# Patient Record
Sex: MALE | Race: WHITE | Employment: OTHER | URBAN - METROPOLITAN AREA
[De-identification: names, ages, dates, MRNs, and addresses within clinical notes are randomized per-mention and may not be internally consistent; named-entity substitution may affect disease eponyms.]

---

## 2020-01-09 ENCOUNTER — OFFICE VISIT (OUTPATIENT)
Dept: PRIMARY CARE CLINIC | Age: 60
End: 2020-01-09
Payer: COMMERCIAL

## 2020-01-09 VITALS
RESPIRATION RATE: 17 BRPM | HEIGHT: 72 IN | BODY MASS INDEX: 32.02 KG/M2 | OXYGEN SATURATION: 98 % | HEART RATE: 78 BPM | TEMPERATURE: 97.2 F | DIASTOLIC BLOOD PRESSURE: 82 MMHG | SYSTOLIC BLOOD PRESSURE: 142 MMHG | WEIGHT: 236.4 LBS

## 2020-01-09 PROCEDURE — 99213 OFFICE O/P EST LOW 20 MIN: CPT | Performed by: NURSE PRACTITIONER

## 2020-01-09 RX ORDER — PREDNISONE 20 MG/1
40 TABLET ORAL DAILY
Qty: 10 TABLET | Refills: 0 | Status: SHIPPED | OUTPATIENT
Start: 2020-01-09 | End: 2020-01-09 | Stop reason: CLARIF

## 2020-01-09 RX ORDER — PREDNISONE 20 MG/1
40 TABLET ORAL DAILY
Qty: 6 TABLET | Refills: 0 | Status: SHIPPED | OUTPATIENT
Start: 2020-01-09 | End: 2020-01-12

## 2020-01-09 RX ORDER — GUAIFENESIN 600 MG/1
600 TABLET, EXTENDED RELEASE ORAL 2 TIMES DAILY
Qty: 30 TABLET | Refills: 0 | Status: SHIPPED | OUTPATIENT
Start: 2020-01-09 | End: 2020-01-24

## 2020-01-09 RX ORDER — AMOXICILLIN AND CLAVULANATE POTASSIUM 875; 125 MG/1; MG/1
1 TABLET, FILM COATED ORAL 2 TIMES DAILY
Qty: 20 TABLET | Refills: 0 | Status: SHIPPED | OUTPATIENT
Start: 2020-01-09 | End: 2020-01-19

## 2020-01-09 ASSESSMENT — ENCOUNTER SYMPTOMS
RHINORRHEA: 0
SINUS COMPLAINT: 1
VOMITING: 0
EYE DISCHARGE: 0
SHORTNESS OF BREATH: 0
SINUS PRESSURE: 1
WHEEZING: 0
SINUS PAIN: 1
NAUSEA: 0
TROUBLE SWALLOWING: 0
COUGH: 1
SORE THROAT: 1
EYE ITCHING: 0
DIARRHEA: 0
EYE PAIN: 0

## 2020-01-09 NOTE — PATIENT INSTRUCTIONS
SURVEY:    You may be receiving a survey from [employer] regarding your visit today. Please complete the survey to enable us to provide the highest quality of care to you and your family. If you cannot score us a very good on any question, please call the office to discuss how we could have made your experience a very good one. Thank you. Patient Education        Sinusitis: Care Instructions  Your Care Instructions    Sinusitis is an infection of the lining of the sinus cavities in your head. Sinusitis often follows a cold. It causes pain and pressure in your head and face. In most cases, sinusitis gets better on its own in 1 to 2 weeks. But some mild symptoms may last for several weeks. Sometimes antibiotics are needed. Follow-up care is a key part of your treatment and safety. Be sure to make and go to all appointments, and call your doctor if you are having problems. It's also a good idea to know your test results and keep a list of the medicines you take. How can you care for yourself at home? · Take an over-the-counter pain medicine, such as acetaminophen (Tylenol), ibuprofen (Advil, Motrin), or naproxen (Aleve). Read and follow all instructions on the label. · If the doctor prescribed antibiotics, take them as directed. Do not stop taking them just because you feel better. You need to take the full course of antibiotics. · Be careful when taking over-the-counter cold or flu medicines and Tylenol at the same time. Many of these medicines have acetaminophen, which is Tylenol. Read the labels to make sure that you are not taking more than the recommended dose. Too much acetaminophen (Tylenol) can be harmful. · Breathe warm, moist air from a steamy shower, a hot bath, or a sink filled with hot water. Avoid cold, dry air. Using a humidifier in your home may help. Follow the directions for cleaning the machine.   · Use saline (saltwater) nasal washes to help keep your nasal passages open and wash out mucus and bacteria. You can buy saline nose drops at a grocery store or drugstore. Or you can make your own at home by adding 1 teaspoon of salt and 1 teaspoon of baking soda to 2 cups of distilled water. If you make your own, fill a bulb syringe with the solution, insert the tip into your nostril, and squeeze gently. Ryan Joshua your nose. · Put a hot, wet towel or a warm gel pack on your face 3 or 4 times a day for 5 to 10 minutes each time. · Try a decongestant nasal spray like oxymetazoline (Afrin). Do not use it for more than 3 days in a row. Using it for more than 3 days can make your congestion worse. When should you call for help? Call your doctor now or seek immediate medical care if:    · You have new or worse swelling or redness in your face or around your eyes.     · You have a new or higher fever.    Watch closely for changes in your health, and be sure to contact your doctor if:    · You have new or worse facial pain.     · The mucus from your nose becomes thicker (like pus) or has new blood in it.     · You are not getting better as expected. Where can you learn more? Go to https://[employer].wedgies. org and sign in to your [employer] account. Enter M071 in the [facility] box to learn more about \"Sinusitis: Care Instructions. \"     If you do not have an account, please click on the \"Sign Up Now\" link. Current as of: July 28, 2019  Content Version: 12.3  © 2851-0943 Healthwise, Incorporated. Care instructions adapted under license by [facility] ([facility]). If you have questions about a medical condition or this instruction, always ask your healthcare professional. [place] [address] any warranty or liability for your use of this information.

## 2020-01-09 NOTE — PROGRESS NOTES
[facility] & [facility] PHYSICIANS  [facility] PRIMARY CARE [place] [address] [address] [facility]  Dept: [phone]  Dept Fax: [phone]    Mahi Allen is a 62 y.o. male who presentsto the [facility] today for his medical conditions/complaints as noted below. Mahi Allen is c/o of Other (sinus pressure, nasal drainage, ear pain x 1 month) and Cough      HPI:     Josh Colmenares is here today for a walk in visit. Sinus Problem   This is a new problem. The current episode started more than 1 month ago. The problem has been gradually worsening since onset. There has been no fever. Associated symptoms include congestion, coughing, ear pain, headaches, sinus pressure, sneezing and a sore throat. Pertinent negatives include no chills or shortness of breath. Past treatments include nothing. No past medical history on file. Current Outpatient Medications   Medication Sig Dispense Refill    amoxicillin-clavulanate (AUGMENTIN) 875-125 MG per tablet Take 1 tablet by mouth 2 times daily for 10 days 20 tablet 0    predniSONE (DELTASONE) 20 MG tablet Take 2 tablets by mouth daily for 3 days 6 tablet 0    guaiFENesin (MUCINEX) 600 MG extended release tablet Take 1 tablet by mouth 2 times daily for 15 days 30 tablet 0     No current facility-administered medications for this visit. No Known Allergies    Subjective:      Review of Systems   Constitutional: Negative for activity change, chills, fatigue and fever. HENT: Positive for congestion, ear pain, postnasal drip, sinus pressure, sinus pain, sneezing and sore throat. Negative for rhinorrhea and trouble swallowing. Eyes: Negative for pain, discharge and itching. Respiratory: Positive for cough. Negative for shortness of breath and wheezing. Cardiovascular: Negative for chest pain and palpitations. Gastrointestinal: Negative for diarrhea, nausea and vomiting. Neurological: Positive for headaches.        Objective:     Physical Will treat with Augmentin. Plan:     Exam findings and plan of care discussed at bedside including:    · Start Augmentin-   today; discussed administration and side effects. I have encouraged to take with a probiotic and food to descrease side effects. Examples of probiotics discussed. · Supportive management discussed including: Encouraged to increase fluids and rest, Mucinex/Guaifenesin OTC as directed on package, Nasal saline spray OTC every couple of hours for nasal congestion, Warm facial packs applied to face for 5 to 10 minutes, 3 times per day. Aleve/Ibuprofen/Tylenol OTC PRN for pain, discomfort or fever  · Written instructions provided with AVS.      · To ER or call 911 if any difficulty breathing, shortness of breath, inability to swallow, hives, rash, facial/tongue swelling or temp greater than 103 degrees. · Follow up as needed with PCP if symptoms worsen or do not improve      Return if symptoms worsen or fail to improve. Orders Placed This Encounter   Medications    DISCONTD: predniSONE (DELTASONE) 20 MG tablet     Sig: Take 2 tablets by mouth daily for 5 days     Dispense:  10 tablet     Refill:  0    amoxicillin-clavulanate (AUGMENTIN) 875-125 MG per tablet     Sig: Take 1 tablet by mouth 2 times daily for 10 days     Dispense:  20 tablet     Refill:  0    predniSONE (DELTASONE) 20 MG tablet     Sig: Take 2 tablets by mouth daily for 3 days     Dispense:  6 tablet     Refill:  0    guaiFENesin (MUCINEX) 600 MG extended release tablet     Sig: Take 1 tablet by mouth 2 times daily for 15 days     Dispense:  30 tablet     Refill:  0          All patient questions answered. Pt voiced understanding.       Electronically signed by ELLIOTT Hutchinson CNP on 1/9/2020 at 10:13 AM

## 2020-02-04 ENCOUNTER — OFFICE VISIT (OUTPATIENT)
Dept: PRIMARY CARE CLINIC | Age: 60
End: 2020-02-04
Payer: COMMERCIAL

## 2020-02-04 VITALS
SYSTOLIC BLOOD PRESSURE: 136 MMHG | HEART RATE: 82 BPM | BODY MASS INDEX: 34.54 KG/M2 | WEIGHT: 255 LBS | HEIGHT: 72 IN | DIASTOLIC BLOOD PRESSURE: 76 MMHG | TEMPERATURE: 97.4 F

## 2020-02-04 PROCEDURE — 99213 OFFICE O/P EST LOW 20 MIN: CPT | Performed by: NURSE PRACTITIONER

## 2020-02-04 RX ORDER — FLUTICASONE PROPIONATE 50 MCG
1 SPRAY, SUSPENSION (ML) NASAL DAILY
Qty: 1 BOTTLE | Refills: 0 | Status: SHIPPED | OUTPATIENT
Start: 2020-02-04

## 2020-02-04 ASSESSMENT — ENCOUNTER SYMPTOMS
SINUS COMPLAINT: 1
SINUS PRESSURE: 0
WHEEZING: 0
COUGH: 0
NAUSEA: 0
DIARRHEA: 0
SHORTNESS OF BREATH: 0
EYE REDNESS: 0
PHOTOPHOBIA: 0
SINUS PAIN: 0
VOMITING: 0
RHINORRHEA: 0

## 2020-02-04 NOTE — PATIENT INSTRUCTIONS
SURVEY:    You may be receiving a survey from [employer] regarding your visit today. Please complete the survey to enable us to provide the highest quality of care to you and your family. If you cannot score us a very good on any question, please call the office to discuss how we could of made your experience a very good one. Thank you. Patient Education        Saline Nasal Washes: Care Instructions  Your Care Instructions  Saline nasal washes help keep the nasal passages open by washing out thick or dried mucus. This simple remedy can help relieve symptoms of allergies, sinusitis, and colds. It also can make the nose feel more comfortable by keeping the mucous membranes moist. You may notice a little burning sensation in your nose the first few times you use the solution, but this usually gets better in a few days. Follow-up care is a key part of your treatment and safety. Be sure to make and go to all appointments, and call your doctor if you are having problems. It's also a good idea to know your test results and keep a list of the medicines you take. How can you care for yourself at home? · You can buy premixed saline solution in a squeeze bottle or other sinus rinse products at a drugstore. Read and follow the instructions on the label. · You also can make your own saline solution by adding 1 teaspoon of salt and 1 teaspoon of baking soda to 2 cups of distilled water. · If you use a homemade solution, pour a small amount into a clean bowl. Using a rubber bulb syringe, squeeze the syringe and place the tip in the salt water. Pull a small amount of the salt water into the syringe by relaxing your hand. · Sit down with your head tilted slightly back. Do not lie down. Put the tip of the bulb syringe or the squeeze bottle a little way into one of your nostrils. Gently drip or squirt a few drops into the nostril. Repeat with the other nostril.  Some sneezing and gagging are normal at first.  · Gently blow your nose. · Wipe the syringe or bottle tip clean after each use. · Repeat this 2 or 3 times a day. · Use nasal washes gently if you have nosebleeds often. When should you call for help? Watch closely for changes in your health, and be sure to contact your doctor if:    · You often get nosebleeds.     · You have problems doing the nasal washes. Where can you learn more? Go to https://[employer] inc. org and sign in to your [employer] account. Enter [phone] [id number] [phone] in the [employer] box to learn more about \"Saline Nasal Washes: Care Instructions. \"     If you do not have an account, please click on the \"Sign Up Now\" link. Current as of: July 28, 2019  Content Version: 12.3  © 3975-8027 Healthwise, Incorporated. Care instructions adapted under license by [facility] ([facility]). If you have questions about a medical condition or this instruction, always ask your healthcare professional. [address]  any warranty or liability for your use of this information.

## 2020-02-05 ENCOUNTER — HOSPITAL ENCOUNTER (OUTPATIENT)
Age: 60
Discharge: HOME | End: 2020-02-05
Payer: COMMERCIAL

## 2020-02-05 LAB
ABSOLUTE EOS #: 0.07 K/UL (ref 0–0.44)
ABSOLUTE IMMATURE GRANULOCYTE: <0.03 K/UL (ref 0–0.3)
ABSOLUTE LYMPH #: 1.58 K/UL (ref 1.1–3.7)
ABSOLUTE MONO #: 0.4 K/UL (ref 0.1–1.2)
ANION GAP SERPL CALCULATED.3IONS-SCNC: 14 MMOL/L (ref 9–17)
BASOPHILS # BLD: 1 % (ref 0–2)
BASOPHILS ABSOLUTE: 0.04 K/UL (ref 0–0.2)
BUN BLDV-MCNC: 15 MG/DL (ref 8–23)
BUN/CREAT BLD: 13 (ref 9–20)
CALCIUM SERPL-MCNC: 9 MG/DL (ref 8.6–10.4)
CHLORIDE BLD-SCNC: 102 MMOL/L (ref 98–107)
CO2: 23 MMOL/L (ref 20–31)
CREAT SERPL-MCNC: 1.12 MG/DL (ref 0.7–1.2)
DIFFERENTIAL TYPE: NORMAL
EOSINOPHILS RELATIVE PERCENT: 2 % (ref 1–4)
GFR AFRICAN AMERICAN: >60 ML/MIN
GFR NON-AFRICAN AMERICAN: >60 ML/MIN
GFR SERPL CREATININE-BSD FRML MDRD: NORMAL ML/MIN/{1.73_M2}
GFR SERPL CREATININE-BSD FRML MDRD: NORMAL ML/MIN/{1.73_M2}
GLUCOSE BLD-MCNC: 97 MG/DL (ref 70–99)
HCT VFR BLD CALC: 43.9 % (ref 40.7–50.3)
HEMOGLOBIN: 14.1 G/DL (ref 13–17)
IMMATURE GRANULOCYTES: 0 %
LYMPHOCYTES # BLD: 35 % (ref 24–43)
MCH RBC QN AUTO: 30.1 PG (ref 25.2–33.5)
MCHC RBC AUTO-ENTMCNC: 32.1 G/DL (ref 28.4–34.8)
MCV RBC AUTO: 93.6 FL (ref 82.6–102.9)
MONOCYTES # BLD: 9 % (ref 3–12)
NRBC AUTOMATED: 0 PER 100 WBC
PDW BLD-RTO: 12.3 % (ref 11.8–14.4)
PLATELET # BLD: 258 K/UL (ref 138–453)
PLATELET ESTIMATE: NORMAL
PMV BLD AUTO: 10.5 FL (ref 8.1–13.5)
POTASSIUM SERPL-SCNC: 4.1 MMOL/L (ref 3.7–5.3)
RBC # BLD: 4.69 M/UL (ref 4.21–5.77)
RBC # BLD: NORMAL 10*6/UL
SEG NEUTROPHILS: 53 % (ref 36–65)
SEGMENTED NEUTROPHILS ABSOLUTE COUNT: 2.4 K/UL (ref 1.5–8.1)
SODIUM BLD-SCNC: 139 MMOL/L (ref 135–144)
TSH SERPL DL<=0.05 MIU/L-ACNC: 3.94 MIU/L (ref 0.3–5)
WBC # BLD: 4.5 K/UL (ref 3.5–11.3)
WBC # BLD: NORMAL 10*3/UL

## 2020-02-05 PROCEDURE — 80048 BASIC METABOLIC PNL TOTAL CA: CPT

## 2020-02-05 PROCEDURE — 36415 COLL VENOUS BLD VENIPUNCTURE: CPT

## 2020-02-05 PROCEDURE — 85025 COMPLETE CBC W/AUTO DIFF WBC: CPT

## 2020-02-05 PROCEDURE — 84443 ASSAY THYROID STIM HORMONE: CPT

## 2020-02-05 ASSESSMENT — ENCOUNTER SYMPTOMS
SORE THROAT: 0
EYE DISCHARGE: 1

## 2020-02-06 ENCOUNTER — OFFICE VISIT (OUTPATIENT)
Dept: OTOLARYNGOLOGY | Age: 60
End: 2020-02-06
Payer: COMMERCIAL

## 2020-02-06 VITALS
SYSTOLIC BLOOD PRESSURE: 124 MMHG | HEIGHT: 72 IN | WEIGHT: 229 LBS | DIASTOLIC BLOOD PRESSURE: 85 MMHG | BODY MASS INDEX: 31.02 KG/M2 | HEART RATE: 76 BPM

## 2020-02-06 PROBLEM — H93.13 TINNITUS OF BOTH EARS: Status: ACTIVE | Noted: 2020-02-06

## 2020-02-06 PROCEDURE — 99203 OFFICE O/P NEW LOW 30 MIN: CPT | Performed by: PHYSICIAN ASSISTANT

## 2020-02-06 SDOH — HEALTH STABILITY: MENTAL HEALTH: HOW OFTEN DO YOU HAVE A DRINK CONTAINING ALCOHOL?: NEVER

## 2020-02-06 ASSESSMENT — ENCOUNTER SYMPTOMS
WHEEZING: 0
EYE DISCHARGE: 1
CHOKING: 0
NAUSEA: 0
COUGH: 0
STRIDOR: 0
EYE PAIN: 0
VOMITING: 0
EYE REDNESS: 0
VOICE CHANGE: 0
PHOTOPHOBIA: 0
TROUBLE SWALLOWING: 0
ABDOMINAL DISTENTION: 0
ABDOMINAL PAIN: 0
CONSTIPATION: 0
CHEST TIGHTNESS: 0
DIARRHEA: 0
RHINORRHEA: 0
RECTAL PAIN: 0
SINUS PAIN: 0
SORE THROAT: 0
BACK PAIN: 0
SHORTNESS OF BREATH: 0
SINUS PRESSURE: 0
BLOOD IN STOOL: 0
ANAL BLEEDING: 0
EYE ITCHING: 0
FACIAL SWELLING: 0
COLOR CHANGE: 0
APNEA: 0

## 2020-02-06 NOTE — PROGRESS NOTES
glands    Temporomandibular Joint:  fine crepitus with motion bilaterally, no tenderness on palpation, no trismus, motion symmetric    EYES:  PERRL, extra-ocular movements intact, no nystagmus, sclera white, no redness of eyes, no watering of eyes    EARS:    Bilateral External Ears: no pits, no tags    Right External Ear: normally formed, no lesions; no mastoid tenderness, redness or swelling    Left External Ear: normally formed, no lesions; no mastoid tenderness, redness or swelling    Right External Auditory Canal: normally formed, no redness, no swelling, no lesions, healthy skin, no obstructing cerumen, no discharge    Left External Auditory Canal: normally formed, no redness, no swelling, no lesions, healthy skin, no obstructing cerumen, no discharge  Scant cerumen removed with suction.     Right Tympanic Membrane:  translucent, immobile to pneumatic otoscopy, no perforation, light reflex present, inferior drum with diluted milky quality (this is a subtle finding), no overt effusion     Left Tympanic Membrane:  translucent, immobile to pneumatic otoscopy, no perforation, light reflex present, inferior drum with diluted milky quality (this is a subtle finding), appears mildly retracted, no overt effusion    Hearing: intact to spoken voice, intact to finger rub both ears, Anthony midline, Right Ear: Rinne AC>BC, Left Ear: Rinne AC>BC    NOSE:    Nasal Skin: no lesions, no lacerations, no scars    Nasal Dorsum: symmetric with no visible or palpable deformities    Nasal Tip: normal symmetric nasal tip, normal nasal valves    Nasal Mucosa: normal, pink and dry, no drainage, no polyps    Septum: not markedly deformed, no exposed vessels, no bleeding, no septal granuloma, no perforation    Turbinates: normal size and conformation, right inferior turbinate 3-4+, left inferior turbinate 1+ or less    ORAL CAVITY/MOUTH:    Lips, teeth, gums: normal lips, normal gums, dentition intact, ground down/worn down occlusal surfaces of the lower anterior dentition     Oral Mucosa: normal, moist, no lesions    Palate: normal hard palate, normal soft palate, symmetric palatal elevation    Floor of Mouth: normal floor of mouth    Tongue: normal tongue, no lesions, no edema, no masses, normal mucosa, mobile    Tonsils: absent    Posterior pharynx: normally formed, no PND, no masses or exudate, no lesions, no redness    NECK:    Neck: no masses, trachea midline, functional active range of motion, no cysts or pits, no tenderness to palpation  Good extension and flexion. Decreased rotation to the right when compared to rotation to the left. Thyroid: normal thyroid, no enlargement, no tenderness, no nodules    LYMPH NODES:    Cervical: no palpable lymph node enlargement    RESPIRATORY:    Inspection/Auscultation: good air movement, chest expands symmetrically, normal breath sounds, no wheezing, no stridor, no crackles, no rhonchi    CARDIOVASCULAR SYSTEM:  Heart regular rate and rhythm, normal S1 and S2, no m/r/g  No carotid thrills, no carotid bruits    Observation/Palpation of Peripheral Vascular System:  no edema    SKIN:    General Appearance:  warm and dry,    NEUROLOGICAL SYSTEM:    Orientation: oriented to time, oriented to place, oriented to person, oriented to situation    Cranial Nerves: Cranial Nerves II-XII intact, normal facial movement    PSYCHIATRIC:    Mood and affect:  Affect appropriate for situation/setting. Assessment and Plan:  The causes of tinnitus are discussed in the context of the patient's history and exam findings. I have discussed the management of tinnitus with the avoidance of silence and the use of background noise for suppression such as a clock radio set between stations, a fan, television, or other sound generating device particularly if the tinnitus is causing disturbance of sleep.  We have discussed that high doses of aspirin and related drugs, caffeine and other stimulants, alcohol use, and

## 2020-02-06 NOTE — PROGRESS NOTES
Review of Systems   Constitutional: Negative for activity change, appetite change, chills, diaphoresis, fever and unexpected weight change. HENT: Positive for postnasal drip, sneezing and tinnitus. Negative for congestion, dental problem, drooling, ear discharge, ear pain, facial swelling, hearing loss, mouth sores, nosebleeds, rhinorrhea, sinus pressure, sinus pain, sore throat, trouble swallowing and voice change. Eyes: Positive for discharge. Negative for photophobia, pain, redness, itching and visual disturbance. Respiratory: Negative for apnea, cough, choking, chest tightness, shortness of breath, wheezing and stridor. Cardiovascular: Negative for chest pain, palpitations and leg swelling. Gastrointestinal: Negative for abdominal distention, abdominal pain, anal bleeding, blood in stool, constipation, diarrhea, nausea, rectal pain and vomiting. Endocrine: Negative for cold intolerance, heat intolerance, polydipsia, polyphagia and polyuria. Genitourinary: Negative for decreased urine volume, difficulty urinating, discharge, dysuria, enuresis, flank pain, frequency, genital sores, hematuria, penile pain, penile swelling, scrotal swelling, testicular pain and urgency. Musculoskeletal: Negative for arthralgias, back pain, gait problem, joint swelling, myalgias, neck pain and neck stiffness. Skin: Negative for color change, pallor, rash and wound. Allergic/Immunologic: Negative for environmental allergies, food allergies and immunocompromised state. Neurological: Negative for dizziness, tremors, seizures, syncope, facial asymmetry, speech difficulty, weakness, light-headedness, numbness and headaches. Hematological: Negative. Negative for adenopathy. Does not bruise/bleed easily. Psychiatric/Behavioral: Negative for agitation, behavioral problems, confusion, decreased concentration, dysphoric mood, hallucinations, self-injury, sleep disturbance and suicidal ideas.  The patient is not

## 2020-02-10 ENCOUNTER — OFFICE VISIT (OUTPATIENT)
Dept: UROLOGY | Age: 60
End: 2020-02-10
Payer: COMMERCIAL

## 2020-02-10 VITALS
DIASTOLIC BLOOD PRESSURE: 70 MMHG | HEIGHT: 72 IN | WEIGHT: 233 LBS | SYSTOLIC BLOOD PRESSURE: 110 MMHG | BODY MASS INDEX: 31.56 KG/M2

## 2020-02-10 PROCEDURE — 51798 US URINE CAPACITY MEASURE: CPT | Performed by: UROLOGY

## 2020-02-10 PROCEDURE — 99204 OFFICE O/P NEW MOD 45 MIN: CPT | Performed by: UROLOGY

## 2020-02-10 ASSESSMENT — ENCOUNTER SYMPTOMS
EYE PAIN: 0
SHORTNESS OF BREATH: 0
VOMITING: 0
COUGH: 0
EYE REDNESS: 0
BACK PAIN: 0
WHEEZING: 0
COLOR CHANGE: 0
NAUSEA: 0
ABDOMINAL PAIN: 0

## 2020-02-10 NOTE — PROGRESS NOTES
HPI:    Patient is a 62 y.o. male in no acute distress. He is alert and oriented to person, place, and time. Patient is here today for new patient. Patient was referred by ELLIOTT Adamson for lower urinary tract symptoms. Patient does have a significant urologic history. He does have a history of prostate biopsy which was negative. He did have an MRI of the prostate done approximately 3 years ago. This did show a PI-RADS 2 lesion. Patient's PSA at that point time was 6.02. Patient has been getting his PSAs done in the [facility] fairs. He does claim that they have ranged from 4-7. Patient does have some baseline voiding issues. He is not interested in any medication. Patient reports no flank pain nausea vomiting today. He has had no unintentional weight loss or decreased appetite. IPSS Questionnaire (AUA-7): Over the past month    1)  How often have you had a sensation of not emptying your bladder completely after you finish urinating? 0 - Not at all   2)  How often have you had to urinate again less than two hours after you finished urinating? 0 - Not at all   3)  How often have you found you stopped and started again several times when you urinated? 1 - Less than 1 time in 5   4) How difficult have you found it to postpone urination? 0 - Not at all   5) How often have you had a weak urinary stream?  3 - About half the time   6) How often have you had to push or strain to begin urination? 0 - Not at all   7) How many times did you most typically get up to urinate from the time you went to bed until the time you got up in the morning?  0 - None   Total 4   QOL 0       No past medical history on file. No past surgical history on file. Outpatient Encounter Medications as of 2/10/2020   Medication Sig Dispense Refill    fluticasone (FLONASE) 50 MCG/ACT nasal spray 1 spray by Each Nostril route daily 1 Bottle 0     No facility-administered encounter medications on file as of 2/10/2020. Current Outpatient Medications on File Prior to Visit   Medication Sig Dispense Refill    fluticasone (FLONASE) 50 MCG/ACT nasal spray 1 spray by Each Nostril route daily 1 Bottle 0     No current facility-administered medications on file prior to visit. Patient has no known allergies. No family history on file. Social History     Tobacco Use   Smoking Status Never Smoker   Smokeless Tobacco Never Used       Social History     Substance and Sexual Activity   Alcohol Use Never    Frequency: Never       Review of Systems   Constitutional: Negative for appetite change, chills and fever. Eyes: Negative for pain, redness and visual disturbance. Respiratory: Negative for cough, shortness of breath and wheezing. Cardiovascular: Negative for chest pain and leg swelling. Gastrointestinal: Negative for abdominal pain, nausea and vomiting. Genitourinary: Negative for difficulty urinating, discharge, dysuria, flank pain, frequency, hematuria, scrotal swelling and testicular pain. Musculoskeletal: Negative for back pain, joint swelling and myalgias. Skin: Negative for color change, rash and wound. Neurological: Negative for dizziness, tremors and numbness. Hematological: Negative for adenopathy. Does not bruise/bleed easily. There were no vitals taken for this visit. PHYSICAL EXAM:  Constitutional: Patient in no acute distress; Neuro: alert and oriented to person place and time. Psych: Mood and affect normal.  Skin: Normal  Lungs: Respiratory effort normal  Cardiovascular:  Normal peripheral pulses  Abdomen: Soft, non-tender, non-distended with no CVA, flank pain, hepatosplenomegaly or hernia. Kidneys normal.  Bladder non-tender and not distended.   Lymphatics: no palpable lymphadenopathy  Penis normal  Urethral meatus normal  Scrotal exam normal  Testicles normal bilaterally  Epididymis normal bilaterally  No evidence of inguinal hernia        Lab Results   Component Value Date

## 2020-02-10 NOTE — PATIENT INSTRUCTIONS
SURVEY:    You may be receiving a survey from [employer] regarding your visit today. Please complete the survey to enable us to provide the highest quality of care to you and your family. If you cannot score us a very good on any question, please call the office to discuss how we could have made your experience a very good one. Thank you.

## 2020-02-11 ENCOUNTER — TELEPHONE (OUTPATIENT)
Dept: UROLOGY | Age: 60
End: 2020-02-11

## 2020-02-11 NOTE — TELEPHONE ENCOUNTER
Patient called back and was informed of response. Patient scheduled for an appointment on 3/18/20 with PSA a few days prior.

## 2020-03-09 ENCOUNTER — TELEPHONE (OUTPATIENT)
Dept: GASTROENTEROLOGY | Age: 60
End: 2020-03-09

## 2020-03-09 DIAGNOSIS — Z86.010 HISTORY OF COLON POLYPS: Primary | ICD-10-CM

## 2020-03-10 PROBLEM — Z86.0100 HISTORY OF COLON POLYPS: Status: ACTIVE | Noted: 2020-03-10

## 2020-03-10 PROBLEM — Z86.010 HISTORY OF COLON POLYPS: Status: ACTIVE | Noted: 2020-03-10

## 2020-03-10 RX ORDER — SODIUM, POTASSIUM,MAG SULFATES 17.5-3.13G
SOLUTION, RECONSTITUTED, ORAL ORAL
Qty: 2 BOTTLE | Refills: 0 | Status: SHIPPED | OUTPATIENT
Start: 2020-03-10

## 2020-03-11 ENCOUNTER — OFFICE VISIT (OUTPATIENT)
Dept: PRIMARY CARE CLINIC | Age: 60
End: 2020-03-11
Payer: COMMERCIAL

## 2020-03-11 ENCOUNTER — HOSPITAL ENCOUNTER (OUTPATIENT)
Age: 60
Discharge: HOME | End: 2020-03-11
Payer: COMMERCIAL

## 2020-03-11 VITALS
HEIGHT: 72 IN | RESPIRATION RATE: 18 BRPM | SYSTOLIC BLOOD PRESSURE: 121 MMHG | WEIGHT: 234 LBS | BODY MASS INDEX: 31.69 KG/M2 | TEMPERATURE: 98 F | DIASTOLIC BLOOD PRESSURE: 72 MMHG | HEART RATE: 74 BPM

## 2020-03-11 LAB — PROSTATE SPECIFIC ANTIGEN: 6.56 UG/L

## 2020-03-11 PROCEDURE — 84153 ASSAY OF PSA TOTAL: CPT

## 2020-03-11 PROCEDURE — 99214 OFFICE O/P EST MOD 30 MIN: CPT | Performed by: NURSE PRACTITIONER

## 2020-03-11 PROCEDURE — 36415 COLL VENOUS BLD VENIPUNCTURE: CPT

## 2020-03-11 SDOH — ECONOMIC STABILITY: TRANSPORTATION INSECURITY
IN THE PAST 12 MONTHS, HAS LACK OF TRANSPORTATION KEPT YOU FROM MEETINGS, WORK, OR FROM GETTING THINGS NEEDED FOR DAILY LIVING?: NO

## 2020-03-11 SDOH — ECONOMIC STABILITY: TRANSPORTATION INSECURITY
IN THE PAST 12 MONTHS, HAS THE LACK OF TRANSPORTATION KEPT YOU FROM MEDICAL APPOINTMENTS OR FROM GETTING MEDICATIONS?: NO

## 2020-03-11 SDOH — ECONOMIC STABILITY: FOOD INSECURITY: WITHIN THE PAST 12 MONTHS, THE FOOD YOU BOUGHT JUST DIDN'T LAST AND YOU DIDN'T HAVE MONEY TO GET MORE.: NEVER TRUE

## 2020-03-11 SDOH — ECONOMIC STABILITY: INCOME INSECURITY: HOW HARD IS IT FOR YOU TO PAY FOR THE VERY BASICS LIKE FOOD, HOUSING, MEDICAL CARE, AND HEATING?: NOT HARD AT ALL

## 2020-03-11 SDOH — ECONOMIC STABILITY: FOOD INSECURITY: WITHIN THE PAST 12 MONTHS, YOU WORRIED THAT YOUR FOOD WOULD RUN OUT BEFORE YOU GOT MONEY TO BUY MORE.: NEVER TRUE

## 2020-03-11 ASSESSMENT — PATIENT HEALTH QUESTIONNAIRE - PHQ9
1. LITTLE INTEREST OR PLEASURE IN DOING THINGS: 0
SUM OF ALL RESPONSES TO PHQ QUESTIONS 1-9: 0
SUM OF ALL RESPONSES TO PHQ9 QUESTIONS 1 & 2: 0
SUM OF ALL RESPONSES TO PHQ QUESTIONS 1-9: 0
2. FEELING DOWN, DEPRESSED OR HOPELESS: 0

## 2020-03-11 ASSESSMENT — ENCOUNTER SYMPTOMS
VOMITING: 0
VISUAL CHANGE: 0
ABDOMINAL PAIN: 0
COUGH: 0
NAUSEA: 0
SWOLLEN GLANDS: 1
SORE THROAT: 0
CHANGE IN BOWEL HABIT: 0

## 2020-03-11 NOTE — PROGRESS NOTES
MCG/ACT nasal spray 1 spray by Each Nostril route daily  Patient not taking: Reported on 3/11/2020 2/4/20   Morenita Roche, APRN - CNP        Allergies:       Patient has no known allergies. Social History:     Tobacco:    reports that he has never smoked. He has never used smokeless tobacco.  Alcohol:      reports no history of alcohol use. Drug Use:  reports no history of drug use. Family History:     No family history on file. Review of Systems:     Positive and Negative as described in HPI    Review of Systems   Constitutional: Negative for chills, diaphoresis, fatigue and fever. HENT: Negative for congestion and sore throat. Respiratory: Negative for cough. Cardiovascular: Negative for chest pain. Gastrointestinal: Negative for abdominal pain, anorexia, change in bowel habit, nausea and vomiting. Musculoskeletal: Negative for arthralgias, joint swelling, myalgias and neck pain. Skin: Negative for rash. Neurological: Negative for vertigo, weakness, numbness and headaches. Physical Exam:   Vitals:  /72 (Site: Left Upper Arm, Position: Sitting, Cuff Size: Large Adult)   Pulse 74   Temp 98 °F (36.7 °C) (Temporal)   Resp 18   Ht 6' (1.829 m)   Wt 234 lb (106.1 kg)   BMI 31.74 kg/m²     Physical Exam  Vitals signs and nursing note reviewed. Constitutional:       General: He is not in acute distress. Appearance: Normal appearance. HENT:      Mouth/Throat:      Mouth: Mucous membranes are moist.   Eyes:      General: No scleral icterus. Conjunctiva/sclera: Conjunctivae normal.   Neck:      Musculoskeletal: Full passive range of motion without pain, normal range of motion and neck supple. Cardiovascular:      Rate and Rhythm: Normal rate and regular rhythm. Heart sounds: No murmur. Pulmonary:      Effort: Pulmonary effort is normal.      Breath sounds: Normal breath sounds. No wheezing.    Abdominal:      General: Bowel sounds are normal. There is no distension. Palpations: Abdomen is soft. Tenderness: There is no abdominal tenderness. Musculoskeletal:      Right lower leg: No edema. Left lower leg: No edema. Lymphadenopathy:      Cervical: Cervical adenopathy present. Skin:     General: Skin is warm and dry. Findings: No rash. Neurological:      Mental Status: He is alert and oriented to person, place, and time. Psychiatric:         Mood and Affect: Mood normal.         Behavior: Behavior normal.         Data:     Lab Results   Component Value Date     02/05/2020    K 4.1 02/05/2020     02/05/2020    CO2 23 02/05/2020    BUN 15 02/05/2020    CREATININE 1.12 02/05/2020    GLUCOSE 97 02/05/2020     Lab Results   Component Value Date    WBC 4.5 02/05/2020    RBC 4.69 02/05/2020    HGB 14.1 02/05/2020    HCT 43.9 02/05/2020    MCV 93.6 02/05/2020    MCH 30.1 02/05/2020    MCHC 32.1 02/05/2020    RDW 12.3 02/05/2020     02/05/2020    MPV 10.5 02/05/2020     Lab Results   Component Value Date    TSH 3.94 02/05/2020     No results found for: CHOL, HDL, PSA, LABA1C    Assessment/Plan:      Diagnosis Orders   1. Cervical adenopathy  US HEAD NECK SOFT TISSUE THYROID   2. Tinnitus of both ears     3. BPH with elevated PSA         1. Nitin received counseling on the following healthy behaviors: nutrition, exercise and medication adherence  2. Patient given educational materials - see patient instructions  3. Was a self-tracking handout given in paper form or via [facility]? No  If yes, see orders or list here. 4.  Discussed use, benefit, and side effects of prescribed medications. Barriers to medication compliance addressed. All patient questions answered. Pt voiced understanding. 5.  Reviewed prior labs and health maintenance  6. Continue current medications, diet and exercise.     Completed Refills   Requested Prescriptions      No prescriptions requested or ordered in this encounter         Return in about 1 year

## 2020-03-17 ENCOUNTER — TELEPHONE (OUTPATIENT)
Dept: UROLOGY | Age: 60
End: 2020-03-17

## 2020-03-17 NOTE — TELEPHONE ENCOUNTER
Plan  Please ask patient if he is experiencing any of the following symptoms     Unintentional weight loss? No     Decreased energy or appetite? No      New or worsening bone/hip/back? No     New or worsening lower urinary tract symptoms? No    **Patient did note that he would like to get a T3 MRI done and questioning where he can do this?

## 2020-03-17 NOTE — TELEPHONE ENCOUNTER
History  2/2020 Referred by ELLIOTT Camp for weak stream and elevated PSA. Patient does have a significant urologic history. He does have a history of prostate biopsy which was negative. 2016 Prostate MRI for PSA of 6.02. This did show a PI-RADS 2 lesion. PSA  9/2019 - 6.17  2/2019 - 7.55  11/2017 - 5.31  7/2017 - 5.89  2/2017 - 5.04  11/2016 - 6.02    Today  PSA is 6.56. Although this is elevated from prior PSA of 6.17 in 9/2019 this is not the highest your PSA has ever been. Plan  Please ask patient if he is experiencing any of the following symptoms    Unintentional weight loss? Decreased energy or appetite? New or worsening bone/hip/back? New or worsening lower urinary tract symptoms? If he answers no to these questions, please move out his follow-up to 6 months with a PSA prior.

## 2020-03-17 NOTE — TELEPHONE ENCOUNTER
He had an MRI of the prostate in 2016 that was negative. We would only repeat the MRI if the PSA continues to rise. Plan for PSA in 6 months as discussed.

## 2020-03-17 NOTE — TELEPHONE ENCOUNTER
Dr. Caprice Whittaker in [place] at [facility]. Message about waiting to schedule.       Need to try a fax to [phone]

## 2020-03-18 ENCOUNTER — OFFICE VISIT (OUTPATIENT)
Dept: UROLOGY | Age: 60
End: 2020-03-18
Payer: COMMERCIAL

## 2020-03-23 NOTE — TELEPHONE ENCOUNTER
Looks as though this patient is in Care Everywhere. If you need anything further please advise. Fax for release of records at [facility], [facility] for Dr. RODRIGUEZ-[phone] Fax Number.

## 2020-03-26 ENCOUNTER — TELEPHONE (OUTPATIENT)
Dept: PRIMARY CARE CLINIC | Age: 60
End: 2020-03-26

## 2020-04-24 ENCOUNTER — TELEPHONE (OUTPATIENT)
Dept: PRIMARY CARE CLINIC | Age: 60
End: 2020-04-24

## 2020-04-27 NOTE — TELEPHONE ENCOUNTER
Dr. Angelica Silverio is on an extended leave. We are checking with you to make sure it is ok for this patient to wait for his return or would you like refer him to another provider? Thank you and sorry for the inconvenience.

## 2020-04-27 NOTE — TELEPHONE ENCOUNTER
Emelyn, can you please call . Ami Sierra and see if he would like to wait or be referred to another GI. Thank you.

## 2020-06-01 ENCOUNTER — TELEPHONE (OUTPATIENT)
Dept: PRIMARY CARE CLINIC | Age: 60
End: 2020-06-01

## 2020-06-01 NOTE — TELEPHONE ENCOUNTER
Patient reminded to get US R/S that he cancelled due to Covid. Patient verbalized understanding. Order re faxed to scheduling.

## 2020-07-16 ENCOUNTER — TELEPHONE (OUTPATIENT)
Dept: PRIMARY CARE CLINIC | Age: 60
End: 2020-07-16

## 2020-08-17 ENCOUNTER — TELEPHONE (OUTPATIENT)
Dept: PRIMARY CARE CLINIC | Age: 60
End: 2020-08-17

## 2020-09-01 ENCOUNTER — OFFICE VISIT (OUTPATIENT)
Dept: PRIMARY CARE CLINIC | Age: 60
End: 2020-09-01
Payer: COMMERCIAL

## 2020-09-01 VITALS
HEART RATE: 84 BPM | RESPIRATION RATE: 18 BRPM | TEMPERATURE: 97.8 F | SYSTOLIC BLOOD PRESSURE: 124 MMHG | WEIGHT: 229.5 LBS | DIASTOLIC BLOOD PRESSURE: 78 MMHG | BODY MASS INDEX: 31.13 KG/M2 | OXYGEN SATURATION: 99 %

## 2020-09-01 PROCEDURE — 99214 OFFICE O/P EST MOD 30 MIN: CPT | Performed by: NURSE PRACTITIONER

## 2020-09-01 ASSESSMENT — ENCOUNTER SYMPTOMS
SORE THROAT: 0
DIARRHEA: 0
CONSTIPATION: 0
SHORTNESS OF BREATH: 0
WHEEZING: 0
VOMITING: 0
RHINORRHEA: 0
COUGH: 0
ABDOMINAL PAIN: 0
NAUSEA: 0

## 2020-09-01 NOTE — PROGRESS NOTES
Name: Jaswinder Barrera  : 1960         Chief Complaint:     Chief Complaint   Patient presents with    Mass     chest       History of Present Illness:      Jaswinder Barrera is a 62 y.o.  male who presents with Mass (chest)      Zulma Denver is here today for a same day office visit. Sternal pain- states has had pain in sternum for approximately 6-7 years, has had films and CT with no appreciable deformity or problem. Pain is worsening lately. Would like further work up. Neck lipoma- has had for some time, originally thought tight muscle, no pain, does not bother ROM        Past Medical History:     History reviewed. No pertinent past medical history. Reviewed all health maintenance requirements and ordered appropriate tests  Health Maintenance Due   Topic Date Due    Colon cancer screen colonoscopy  2008       Past Surgical History:     History reviewed. No pertinent surgical history. Medications:       Prior to Admission medications    Medication Sig Start Date End Date Taking? Authorizing Provider   Na Sulfate-K Sulfate-Mg Sulf (SUPREP BOWEL PREP KIT) 17.5-3.13-1.6 GM/177ML SOLN Take as directed  Patient not taking: Reported on 3/11/2020 3/10/20   Saud Del Rio MD   fluticasone [facility]) 50 MCG/ACT nasal spray 1 spray by Each Nostril route daily  Patient not taking: Reported on 3/11/2020 2/4/20   ELLIOTT Burns - CNP        Allergies:       Patient has no known allergies. Social History:     Tobacco:    reports that he has never smoked. He has never used smokeless tobacco.  Alcohol:      reports no history of alcohol use. Drug Use:  reports no history of drug use. Family History:     History reviewed. No pertinent family history. Review of Systems:     Positive and Negative as described in HPI    Review of Systems   Constitutional: Negative for chills, fatigue and fever. HENT: Negative for congestion, rhinorrhea and sore throat. Eyes: Negative for visual disturbance. Respiratory: Negative for cough, shortness of breath and wheezing. Cardiovascular: Positive for chest pain (tenderness). Negative for palpitations. Gastrointestinal: Negative for abdominal pain, constipation, diarrhea, nausea and vomiting. Genitourinary: Negative for difficulty urinating and dysuria. Musculoskeletal: Negative for gait problem, neck pain and neck stiffness. Skin: Negative for rash. Neurological: Negative for dizziness, syncope, light-headedness and headaches. Physical Exam:   Vitals:  /78   Pulse 84   Temp 97.8 °F (36.6 °C) (Temporal)   Resp 18   Wt 229 lb 8 oz (104.1 kg)   SpO2 99%   BMI 31.13 kg/m²     Physical Exam  Vitals signs and nursing note reviewed. Constitutional:       General: He is not in acute distress. Appearance: Normal appearance. He is not ill-appearing. HENT:      Mouth/Throat:      Mouth: Mucous membranes are moist.   Eyes:      General: No scleral icterus. Conjunctiva/sclera: Conjunctivae normal.   Neck:      Musculoskeletal: Normal range of motion and neck supple. No spinous process tenderness or muscular tenderness. Cardiovascular:      Rate and Rhythm: Normal rate and regular rhythm. Heart sounds: No murmur. Pulmonary:      Effort: Pulmonary effort is normal.      Breath sounds: Normal breath sounds. Chest:      Chest wall: Tenderness present. Abdominal:      General: Bowel sounds are normal. There is no distension. Palpations: Abdomen is soft. Tenderness: There is no abdominal tenderness. Musculoskeletal:      Right lower leg: No edema. Left lower leg: No edema. Lymphadenopathy:      Cervical: No cervical adenopathy. Skin:     General: Skin is warm and dry. Findings: No rash. Neurological:      Mental Status: He is alert and oriented to person, place, and time.    Psychiatric:         Mood and Affect: Mood normal.         Behavior: Behavior normal.         Data:     Lab Results Component Value Date     02/05/2020    K 4.1 02/05/2020     02/05/2020    CO2 23 02/05/2020    BUN 15 02/05/2020    CREATININE 1.12 02/05/2020    GLUCOSE 97 02/05/2020     Lab Results   Component Value Date    WBC 4.5 02/05/2020    RBC 4.69 02/05/2020    HGB 14.1 02/05/2020    HCT 43.9 02/05/2020    MCV 93.6 02/05/2020    MCH 30.1 02/05/2020    MCHC 32.1 02/05/2020    RDW 12.3 02/05/2020     02/05/2020    MPV 10.5 02/05/2020     Lab Results   Component Value Date    TSH 3.94 02/05/2020     Lab Results   Component Value Date    PSA 6.56 03/11/2020       Assessment/Plan:      Diagnosis Orders   1. Sternal pain     2. Lipoma of neck       Will speak with radiologist regarding proper testing for sternal pain. 1.  Nitin received counseling on the following healthy behaviors: nutrition and exercise  2. Patient given educational materials - see patient instructions  3. Was a self-tracking handout given in paper form or via [facility]? No  If yes, see orders or list here. 4.  Discussed use, benefit, and side effects of prescribed medications. Barriers to medication compliance addressed. All patient questions answered. Pt voiced understanding. 5.  Reviewed prior labs and health maintenance  6. Continue current medications, diet and exercise. Completed Refills   Requested Prescriptions      No prescriptions requested or ordered in this encounter         Return if symptoms worsen or fail to improve.

## 2020-09-02 ENCOUNTER — TELEPHONE (OUTPATIENT)
Dept: PRIMARY CARE CLINIC | Age: 60
End: 2020-09-02

## 2020-09-02 NOTE — TELEPHONE ENCOUNTER
Tried to call patient to notify him. Unable to leave message. Will reattempt to call patient back soon. Order faxed to scheduling.

## 2020-09-02 NOTE — TELEPHONE ENCOUNTER
Please let him know that CT chest without contrast was recommended by the radiology department. I will place the order.   Thank you

## 2020-09-02 NOTE — TELEPHONE ENCOUNTER
Patient was seen in office yesterday for mass on his chest/sternal pain. He called to see what testing you wanted done? Thanks.

## 2020-09-08 ENCOUNTER — TELEPHONE (OUTPATIENT)
Dept: PRIMARY CARE CLINIC | Age: 60
End: 2020-09-08

## 2020-09-08 NOTE — TELEPHONE ENCOUNTER
The non-contrast CT was recommended by the radiology department. If he is thinking cancer of the bone I would recommend Nuclear bone scan. He did not mention that to me during the visit. Thank you.

## 2020-09-08 NOTE — TELEPHONE ENCOUNTER
I called patient and gave him the information. He states, \"First I need one without contrast and then one with contrast immediately following. Can you tell Abram Cain that? \" Told patient I will let you know.

## 2020-09-08 NOTE — TELEPHONE ENCOUNTER
Patient called in to schedule his CT. He called our office back after speaking with scheduling, and did not make his appt for CT. He states \"the order is without contrast and I think it should be with contrast to look for cancer\". Patient wanted me to ask you if he should have contrast or not.

## 2020-09-17 ENCOUNTER — HOSPITAL ENCOUNTER (OUTPATIENT)
Age: 60
Discharge: HOME | End: 2020-09-19
Payer: COMMERCIAL

## 2020-09-17 ENCOUNTER — TELEPHONE (OUTPATIENT)
Dept: PRIMARY CARE CLINIC | Age: 60
End: 2020-09-17

## 2020-09-17 ENCOUNTER — HOSPITAL ENCOUNTER (OUTPATIENT)
Dept: GENERAL RADIOLOGY | Age: 60
Discharge: HOME | End: 2020-09-19
Payer: COMMERCIAL

## 2020-09-17 PROCEDURE — 71046 X-RAY EXAM CHEST 2 VIEWS: CPT

## 2020-09-17 NOTE — TELEPHONE ENCOUNTER
pts CT of the chest was denied. Letter scanned in.  Guidelines support an initial chest x-ray or ultrasound in the evaluation of a known or suspected chest wall mass to rule out abnormal findings inside the chest cavity to find calcium in the mass and to rule out destruction of the bones in the chest wall.                 Health Maintenance   Topic Date Due    Lipid screen  12/20/1998    Colon cancer screen colonoscopy  12/20/2008    Shingles Vaccine (1 of 2) 03/11/2021 (Originally 12/20/2008)    Hepatitis C screen  03/11/2021 (Originally 1960)    HIV screen  03/11/2021 (Originally 12/20/1973)    Flu vaccine (1) 09/01/2021 (Originally 9/1/2020)    PSA counseling  03/11/2021    DTaP/Tdap/Td vaccine (2 - Td) 10/23/2028    Hepatitis A vaccine  Aged Out    Hepatitis B vaccine  Aged Out    Hib vaccine  Aged Out    Meningococcal (ACWY) vaccine  Aged Out    Pneumococcal 0-64 years Vaccine  Aged Out             (applicable per patient's age: Cancer Screenings, Depression Screening, Fall Risk Screening, Immunizations)    BUN (mg/dL)   Date Value   02/05/2020 15      (goal A1C is < 7)   (goal LDL is <100) need 30-50% reduction from baseline     BP Readings from Last 3 Encounters:   09/01/20 124/78   03/11/20 121/72   02/10/20 110/70    (goal /80)      All Future Testing planned in [facility]:  Lab Frequency Next Occurrence   COLONOSCOPY W/ OR W/O BIOPSY Once 03/10/2020   PSA, Diagnostic Once 09/13/2020   CT CHEST WO CONTRAST Once 09/19/2020       Next Visit Date:  Future Appointments   Date Time Provider Sara Rowland   9/17/2020  6:00 PM [facility] CAT SCAN ROOM [facility]   3/11/2021  9:00 AM Bette Favors Might, APRN - CNP [facility]            Patient Active Problem List:     Tinnitus of both ears     History of colon polyps

## 2020-09-17 NOTE — TELEPHONE ENCOUNTER
He did have ultrasound and chest x-ray in 2013 or 15. Please have him check with his insurance company to see if this needs repeated.   Thank you

## 2020-09-17 NOTE — TELEPHONE ENCOUNTER
Writer called patient to advise him to call his insurance to see if a chest xray and ultrasound need repeated. Patient became angry and stated that this is ridiculous and that his CT is scheduled for tonight. Writer advised pt we just received the denial letter and that per edil's message below he needs to contact the insurance to see if this needs repeated. Before pt hung up he demanded that writer call scheduling and cancel the CT.   Writer called and canceled CT>

## 2020-09-17 NOTE — TELEPHONE ENCOUNTER
----- Message from ELLIOTT Fleming CNP sent at 9/17/2020  4:01 PM EDT -----  Please notify patient that their lab results are normal.

## 2020-09-22 NOTE — TELEPHONE ENCOUNTER
Patient notified procedure is cancelled due to Dr. Hadley Rivera retiring. Patient prefers to have information sent to general surgery. Informed patient he will be receiving a call from that office.

## 2020-11-16 ENCOUNTER — TELEPHONE (OUTPATIENT)
Dept: PRIMARY CARE CLINIC | Age: 60
End: 2020-11-16

## 2020-11-16 ENCOUNTER — HOSPITAL ENCOUNTER (OUTPATIENT)
Dept: OCCUPATIONAL THERAPY | Age: 60
Setting detail: THERAPIES SERIES
Discharge: HOME | End: 2020-11-16
Payer: COMMERCIAL

## 2020-11-16 ENCOUNTER — HOSPITAL ENCOUNTER (OUTPATIENT)
Dept: LAB | Age: 60
Discharge: HOME | End: 2020-11-16
Payer: COMMERCIAL

## 2020-11-16 ENCOUNTER — HOSPITAL ENCOUNTER (OUTPATIENT)
Dept: CT IMAGING | Age: 60
Discharge: HOME | End: 2020-11-18
Payer: COMMERCIAL

## 2020-11-16 LAB
BUN BLDV-MCNC: 18 MG/DL (ref 8–23)
CREAT SERPL-MCNC: 1.26 MG/DL (ref 0.7–1.2)
GFR AFRICAN AMERICAN: >60 ML/MIN
GFR NON-AFRICAN AMERICAN: 58 ML/MIN
GFR SERPL CREATININE-BSD FRML MDRD: ABNORMAL ML/MIN/{1.73_M2}
GFR SERPL CREATININE-BSD FRML MDRD: ABNORMAL ML/MIN/{1.73_M2}

## 2020-11-16 PROCEDURE — 97140 MANUAL THERAPY 1/> REGIONS: CPT

## 2020-11-16 PROCEDURE — 84520 ASSAY OF UREA NITROGEN: CPT

## 2020-11-16 PROCEDURE — 71260 CT THORAX DX C+: CPT

## 2020-11-16 PROCEDURE — 97035 APP MDLTY 1+ULTRASOUND EA 15: CPT

## 2020-11-16 PROCEDURE — 97165 OT EVAL LOW COMPLEX 30 MIN: CPT

## 2020-11-16 PROCEDURE — 6360000004 HC RX CONTRAST MEDICATION: Performed by: NURSE PRACTITIONER

## 2020-11-16 PROCEDURE — 82565 ASSAY OF CREATININE: CPT

## 2020-11-16 PROCEDURE — 36415 COLL VENOUS BLD VENIPUNCTURE: CPT

## 2020-11-16 RX ADMIN — IOPAMIDOL 75 ML: 755 INJECTION, SOLUTION INTRAVENOUS at 07:53

## 2020-11-16 NOTE — PLAN OF CARE
Phone: [phone] [facility]           Fax: [phone]                           OutpatientOccupational Therapy                                                                            Initial Evaluation      Name:  Cece Horan  Date: 11/16/2020  Referring Practitioner: Sara Palomino MD  Medical Diagnosis: M25.522, L elbow pain; M24.822, Other specific joint derangements of L elbow; M75.22, Biceps tendinitis of L UE  Rehab Diagnosis/ICD-10#s:M25.522, L elbow pain  Insurance: OT Insurance Information: [employer]  Total # of Visits to Date: 1  Next  Appointment: \"When this is all over. \"  Chief Complaint: L elbow pain post \"catching\"  [facility] #: [id number]  Onset Date: 11/16/20    Mechanism of Injury:  Farming accident    Surgery Date: n/a    Precautions:   [x]None  [] Fall Risk  []WB Status  [] Pacemaker   []Other:            Involved Extremity:      [x] Left [] Right  Dominant: [] Left [x]Right    Work Status:   [x] Normal [] Restricted [] Off D/T Injury/Condition [] Retired [] Unemployed [] Disabled []Other:  Critical Job/Daily Tasks:  Patient is a self-employed farmer    Subjective:  Patient states that 2 years ago he was working (farming) with his L shoulder in scaption overhead with elbow flex to 90 with forearm hyperpronated, when approximately 200# of dirt fell on his arm. Patient states that she did not have immediate pain but started to notice \"clicking\" or \"catching\" at the elbow with extension. States that he really only experiences pain with the elbow \"catches\" 3 or more times or if he is carrying a 5# bucket. Patient states that this has been going on since 2018. Patient states that he has to attempt conservative treatment prior to MRI approval from insurance. Patient reports that pain interferes with 60% of his daily tasks, extension above shoulder makes the pain worse, and has not found anything to relieve pain.  Patient reports no significant medical history or allergies. Pain: Intensity:  7 /10 Location:   L elbow  Pain Type: [] Constant [x] Intermittent   [  ] with pain meds at rest   [] With movement/Resistive activity [] With Sedentary activity      Objective:    Gross MMT: 5/5 throughout all UE testing planes    ELBOW ROM Right Left   Elbow Flexion / Extension 5-145 5-125     DASH: 6% impairment; patient rated 1/5 level of difficulty throughout daily tasks, however, rated 3/5 on pain questions and 4/5 on feeling less capable and confident because of his L UE issue. Note: Unable to recreate pain through various positions and testing    Patient Goals:   Try conservative tx prior to MRI approval; relieve pain     Problems:     [x] Pain   [] Adherent Scar    [] ROM  [] Edema  [] Strength  [] Open Wound  [x] Function  [] Coordination               [] Sensation            [] Falls: History/Risk of      Goals:   Short term goals  Time Frame for Short term goals: 3 weeks  Short term goal 1: Patient to state compliance and independence c HEP. Short term goal 2: Patient to be educated on joint protection principles to improve independence with work tasks while promoting healing of L elbow tendinitis. Long term goals  Time Frame for Long term goals : 4 weeks  Long term goal 1: Patient to state pain <3/10 at worst througout heavy work tasks. Long term goal 2: Patient to tolerate heavy overhead tasks and carrying up to 50# LUE with minimal c/o increased pain to improve tolerance to work tasks. Treatment Potential: []Good []Fair []Poor    Comments/Assessment:  Patient presents this date with OT order for eval/treat L elbow pain, biceps tendonitis, and joint derangements of L elbow. Patient presents with pain on posterior aspect of L elbow over olecranon, within the olec fossa region. Unable to reproduce pain and symptoms throughout various testing in different positions.  Patient possible triceps tendinitis, impingement, repetitive joint strain or joint swelling. OT to address pain and promote healing to improve patient daily functioning and work tasks. Treatment this date:  Patient issued HEP c education. Manual techniques c elbow joint distractions and mobilizations. U/S minutes around olecranon fossa for healing/pain. 1MHz, .8 intensity, 100% x 8 minutes. Patient tolerated well c skin intact pre and post treatment. Home Program Initiated:   [ x ] Written    [ x ] Verbal    [ x ] Demo   Comprehension of Education: [x] Yes [] No [] Needs Review  [x]Plans /[x] Goals, [x]Risks/ [x] Benefits discussed with [x] Patient []Family    ______________________________________________________________________    Plan of Care dates of services to include:  11/16/2020 to 12/14/20       Treatment Plan:  Frequency/Duration:   3    Times a week, for   4   Weeks. [x] Therapeutic Exercise 57155 [x] Electrical Stim [id number]    [x] Manual Therapy 86107                   [] Attended Electrical Stim [id number]  [x] Therapeutic Activities 53681          [x] [place] 68839  [x] Written Home Program                  [x] Hot Pack/Cold Pack 47809  [x] Iontophoresis 91229  [x] Ultrasound 46889  []Neuro Re-Ed  63224  [x] Fluidotherapy/Whirlpool 43756   [x] AROM                                          [] Ortho Fit/Train 32391  [x] PROM/Stretching                     [] Ortho Re-Fit/Check  45569  ______________________________________________________________________       HEP Weight/  Level Reps/Time Comments    Elbow Isometrics x  Initiated as HEP                                        Electronically signed by CHADWICK Puente 11/16/2020 3:53 PM    Minutes Tracking:  Time In: 5611  Time Out: 4105  Minutes: 47  Timed Code Treatment Minutes: 45 Minutes      Medicare/Regulatory Requirements  We must have a signed plan of care statement by the physician for the current time frame of the prescription and a minimum of every 90 days.    [] I have reviewed this plan of care and certify the need for medically necessary services.     Physician Signature: _________________________ Date: _______________

## 2020-11-16 NOTE — TELEPHONE ENCOUNTER
Devin Tash returned previous call from [facility]. He would like a referral to cardiothoracic surgery to be scheduled before the end of the year.      ----- Message from [address] [place] [address], APRN - CNP sent at 11/16/2020  8:59 AM EST -----  Results are normal, please call patient and make them aware. There is no abnormality noted on CT with contrast in the area of concern. If he would like he could follow with cardiothoracic surgery or general surgery for further evaluation.

## 2020-11-16 NOTE — TELEPHONE ENCOUNTER
----- Message from [address] [place] [address], ELLIOTT - CNP sent at 11/16/2020  8:59 AM EST -----  Results are normal, please call patient and make them aware. There is no abnormality noted on CT with contrast in the area of concern. If he would like he could follow with cardiothoracic surgery or general surgery for further evaluation.

## 2020-11-16 NOTE — TELEPHONE ENCOUNTER
----- Message from [address] [place] [address], ELLIOTT - CNP sent at 11/16/2020  8:59 AM EST -----  Results are normal, please call patient and make them aware. There is no abnormality noted on CT with contrast in the area of concern. If he would like he could follow with cardiothoracic surgery or general surgery for further evaluation.

## 2020-11-18 ENCOUNTER — TELEPHONE (OUTPATIENT)
Dept: CARDIOTHORACIC SURGERY | Age: 60
End: 2020-11-18

## 2020-11-18 ENCOUNTER — TELEPHONE (OUTPATIENT)
Dept: PRIMARY CARE CLINIC | Age: 60
End: 2020-11-18

## 2020-11-18 ENCOUNTER — HOSPITAL ENCOUNTER (OUTPATIENT)
Dept: OCCUPATIONAL THERAPY | Age: 60
Setting detail: THERAPIES SERIES
Discharge: HOME | End: 2020-11-18
Payer: COMMERCIAL

## 2020-11-18 PROCEDURE — 97140 MANUAL THERAPY 1/> REGIONS: CPT

## 2020-11-18 PROCEDURE — 97035 APP MDLTY 1+ULTRASOUND EA 15: CPT

## 2020-11-18 NOTE — TELEPHONE ENCOUNTER
Koki Canas, please refer to telephone encounter from earlier today from Cardiothoracic office in pt. Chart. Pt. Calls office stating he was advised by office we sent him to can not do anything for him. Would like to refer to gen surg?   Please advise, thank you

## 2020-11-18 NOTE — TELEPHONE ENCOUNTER
Received referral for \"sternal pain\" from pts PCP. Spoke to YU Burroughs regarding this and he states it would be something for General Surgery to see since the lump is on outside of his sternum. Called pt and explained this to him. He verbalized understanding.

## 2020-11-18 NOTE — PROGRESS NOTES
Phone: Miri    Fax: [phone]                       Outpatient Occupational Therapy                 DAILY TREATMENT NOTE    Date: 11/18/2020  Patients Name:  Daniele Butt  YOB: 1960 (62 y.o.)  Gender:  male  MRN:  [id number]  [facility] #: [id number]  Medical Diagnosis: M25.522, L elbow pain; M24.822, Other specific joint derangements of L elbow; M75.22, Biceps tendinitis of L UE    Referring Practitioner: Live Marion MD     INSURANCE  OT Insurance Information: Medical [place] BINU       Total # of Visits to Date: 2       PAIN  [x]No     []Yes      Location:   Pain Rating (0-10 pain scale):   Pain Description:     SUBJECTIVE    Patient reports that he failed to tell me at evaluation that he heard a lout pop after initial injury. Patient also stated that he was unable to actively move his elbow following injury and he had to use his R hand to move LUE. Patient also reports that he feels his L elbow is more stiff following u/s treatment from previous eval/treatment. Re-edu patient on what u/s provides. Flow Sheet  Exercise /   Manual treatment Weight/  Level Reps/Time Comments    Joint traction and glides x  L elbow to open joint space to improve ROM and decrease pain   Alternating isometrics  x x10 x 5 second hold Elbow flexion/extension for NMR and strength improvement   AAROM c progressive stretch x  L elbow flexion to improve ROM    Cupping x x3 cups L triceps to improve STM and promote ROM; active gliding during AROM                                                                                                                       Modality Flow Sheet:  START STOP Tx Modality     Electrical Stim:      8 minutes Ultrasound: _1__ W/cm2 x __8_ mins  Duty factor: _x_100%  __50%  __20% __10%  Head size:   MHz: __1mHz __2 mHz  _x_3mHz  Location: L posterior elbow, prox to olecranon to promote healing and for pain.  Patient tolerated well c skin intact pre Other        Electronically signed by ALFONSO Khan OTR/L 11/18/2020 1:52 PM

## 2020-11-20 ENCOUNTER — HOSPITAL ENCOUNTER (OUTPATIENT)
Dept: OCCUPATIONAL THERAPY | Age: 60
Setting detail: THERAPIES SERIES
Discharge: HOME | End: 2020-11-20
Payer: COMMERCIAL

## 2020-11-20 PROCEDURE — 97035 APP MDLTY 1+ULTRASOUND EA 15: CPT

## 2020-11-20 PROCEDURE — 97110 THERAPEUTIC EXERCISES: CPT

## 2020-11-20 PROCEDURE — 97018 PARAFFIN BATH THERAPY: CPT

## 2020-11-20 NOTE — PROGRESS NOTES
Phone: Miri    Fax: [phone]                       Outpatient Occupational Therapy                 DAILY TREATMENT NOTE    Date: 11/20/2020  Patients Name:  Raghu Edward  YOB: 1960 (62 y.o.)  Gender:  male  MRN:  [id number]  [facility] #: [id number]  Medical Diagnosis: M25.522, L elbow pain; M24.822, Other specific joint derangements of L elbow; M75.22, Biceps tendinitis of L UE    Referring Practitioner: Edy Youngblood MD     INSURANCE  OT Insurance Information: Medical [place] BINU       Total # of Visits to Date: 3       PAIN  []No     [x]Yes      Location: L elbow  Pain Rating (0-10 pain scale): ranges from 0-7/10 dependent upon activity level. Pain Description: \"it just hurts\" pt unable to describe. Intensity increases with act    SUBJECTIVE   Pt would like to reduce visits to 2xs weekly until POC is up and he can get MRI. Flow Sheet  Exercise /   Manual treatment Weight/  Level Reps/Time Comments    Joint traction and glides x   L elbow to open joint space to improve ROM and decrease pain   Alternating isometrics  x x5 x 5 second hold Elbow flexion/extension for NMR and strength improvement   AAROM c progressive stretch x   L elbow flexion to improve ROM    Cupping x  L triceps to improve STM and promote ROM; active gliding during AROM                                                                                                                                                         Modality Flow Sheet:  START STOP Tx Modality       Electrical Stim:        8 minutes Ultrasound: _1__ W/cm2 x __8_ mins  Duty factor: _x_100%  __50%  __20% __10%  Head size:   MHz: __1mHz __2 mHz  _x_3mHz  Location: L posterior elbow, prox to olecranon to promote healing and for pain. Patient tolerated well c skin intact pre and post treatment.      10\" Hot Pack: L elbow to increase elasticity of tissue and reduce pain/swelling.  Skin intact pre and post      10\" Paraffin:  L elbow to increase elasticity of tissue and reduce pain/swelling. Skin intact pre and post       Cold Pack:      GOALS/ TREATMENT SESSION:      Time Frame for Long term goals : 4 weeks       Long term goal 1: Patient to state pain <3/10 at worst througout heavy work tasks. Continue- can reach 7/10 []? Met  [x]? Partially met  []? Not met   Long term goal 2: Patient to tolerate heavy overhead tasks and carrying up to 50# LUE with minimal c/o increased pain to improve tolerance to work tasks. Continue       []? Met  [x]? Partially met  []? Not met   Time Frame for Short term goals: 3 weeks       Short term goal 1: Patient to state compliance and independence c HEP. Continue- pt reports isometrics HEP and occ ICE baths []? Met  [x]? Partially met  []? Not met   Short term goal 2: Patient to be educated on joint protection principles to improve independence with work tasks while promoting healing of L elbow tendinitis. Continue []? Met  [x]? Partially met  []? Not met   ADDITIONAL COMMENTS               EDUCATION  New Education provided to patient/family/caregiver:    [x]Yes:     []No (Continued review of prior education)   If yes Education Provided: Pt edu on joint immobilization to allow healing as well as contrast of HP/CP    Method of Education:     [x]Discussion     [x]Demonstration    [] Written     []Other  Evaluation of Patients Response to Education:         [x]Patient and or caregiver verbalized understanding  []Patient and or Caregiver Demonstrated without assistance   []Patient and or Caregiver Demonstrated with assistance  []Needs additional instruction to demonstrate understanding of education    ASSESSMENT  Patient tolerated todays treatment session:    [x] Good   []  Fair   []  Poor  Limitations/difficulties with treatment session due to:   []Pain     []Fatigue     []Other medical complications     []Other  Goal Assessment: [x] No Change    []Improved  Comments:    Minutes Tracking:   IN: 8:32pm  OUT:9:14pm      total tx time: 40 min         PLAN  [x]Continue with current plan of care  [][facility]  []IHold per patient request  [] Change Treatment plan:  [] Insurance hold  __ Other        Electronically signed by RODRÍGUEZ Mendoza 11/20/2020 8:35 AM

## 2020-11-25 ENCOUNTER — APPOINTMENT (OUTPATIENT)
Dept: OCCUPATIONAL THERAPY | Age: 60
End: 2020-11-25
Payer: COMMERCIAL

## 2020-11-27 ENCOUNTER — APPOINTMENT (OUTPATIENT)
Dept: OCCUPATIONAL THERAPY | Age: 60
End: 2020-11-27
Payer: COMMERCIAL

## 2020-11-30 ENCOUNTER — TELEPHONE (OUTPATIENT)
Dept: PRIMARY CARE CLINIC | Age: 60
End: 2020-11-30

## 2020-11-30 NOTE — DISCHARGE SUMMARY
Occupational [address]  Occupational Therapy Discharge Note    Date: 2020      Patient: Ari Borjas  : 1960  MRN: [id number]    Referring Practitioner: Mike Yadav MD   Medical Diagnosis: M25.522, L elbow pain; M24.822, Other specific joint derangements of L elbow; M75.22, Biceps tendinitis of L UE  Rehab Diagnosis/ICD-10#s:   Insurance: OT Insurance Information: Medical [place] [employer]UN  Total # of Visits to Date: 3   Onset Date: Onset Date: 20     Next Dr. Olivier Nuñez: unknown  Total visits attended: 3  Cancels/No shows: 3  Date of initial visit: 2020                  Date of final visit: 2020  [facility] #: [id number]      Subjective:  Pain:  []? No     [x]? Yes      Location: L elbow  Pain Rating (0-10 pain scale): ranges from 0-7/10 dependent upon activity level. Pain Description: \"it just hurts\" pt unable to describe. Intensity increases with act       Goals:     Time Frame for Long term goals : 4 weeks       Long term goal 1: Patient to state pain <3/10 at worst througout heavy work tasks. Discontinue []? ?Met  [x]? ?Partially met  []? ? Not met   Long term goal 2: Patient to tolerate heavy overhead tasks and carrying up to 50# LUE with minimal c/o increased pain to improve tolerance to work tasks. Discontinue       []? ?Met  [x]? ?Partially met  []? ? Not met   Time Frame for Short term goals: 3 weeks       Short term goal 1: Patient to state compliance and independence c HEP. Discontinue []? ?Met  [x]? ?Partially met  []? ? Not met   Short term goal 2: Patient to be educated on joint protection principles to improve independence with work tasks while promoting healing of L elbow tendinitis. Discontinue []? ?Met  [x]? ?Partially met  []? ? Not met   ADDITIONAL COMMENTS                   Treatment to Date:  [x] Therapeutic Exercise    [x] Modalities:  [x] Therapeutic Activity    [x] Ultrasound  [x] Electrical Stimulation  [] Ortho refit/check             [] Massage [] Fluidotherapy  [] Neuromuscular Re-education [x] Cold/hotpack [] Iontophoresis: 4 mg/mL  [x] Instruction in Home Exercise Program                     Dexamethasone Sodium  [x] Manual Therapy             Phosphate 40-80 mAmin          [] Paraffin        [] Other:    Discharge Status:     [] Pt recovered from conditions. Treatment goals were met. [] Pt received maximum benefit. No further therapy indicated at this time. [] Pt to continue exercise/home instructions independently. [] Therapy interrupted due to:    [] Pt has 2 or more no shows/cancels, is discontinued per our policy. [] Pt has completed prescribed number of treatment sessions. [x] Other: Patient request d/c        Electronically signed by ALFONSO Niño, OTR/L 11/30/2020 11:40 AM    If you have any questions or concerns, please don't hesitate to call.   Thank you for your referral.

## 2020-11-30 NOTE — PROGRESS NOTES
[facility]  Inpatient/Observation/Outpatient Rehabilitation    Date: 2020  Patient Name: Cecy Villaseñor       [] Inpatient Acute/Observation       []  Outpatient  : 1960       [] Pt no showed for scheduled appointment    [] Pt refused/declined therapy at this time due to:           [x] Pt cancelled: Patient called front office and cx all remainining appointments, stated that, \"he is done with therapy. \" Patient taken off schedule and d/c this date.       Cristy Potts Date: 2020

## 2020-11-30 NOTE — TELEPHONE ENCOUNTER
Aydin Carson called to request appt with [facility] regarding sternum pain. He wants \"this taken care of\" by the end of the year and it has \"already been weeks with nothing done\". Spoke with [facility], he will have Ozzie Solano call pt to discuss.

## 2020-12-01 ENCOUNTER — HOSPITAL ENCOUNTER (OUTPATIENT)
Dept: OCCUPATIONAL THERAPY | Age: 60
Setting detail: THERAPIES SERIES
Discharge: HOME | End: 2020-12-01
Payer: COMMERCIAL

## 2020-12-23 ENCOUNTER — OFFICE VISIT (OUTPATIENT)
Dept: SURGERY | Age: 60
End: 2020-12-23
Payer: COMMERCIAL

## 2020-12-23 VITALS
HEART RATE: 78 BPM | TEMPERATURE: 98.1 F | WEIGHT: 223.4 LBS | HEIGHT: 72 IN | BODY MASS INDEX: 30.26 KG/M2 | SYSTOLIC BLOOD PRESSURE: 125 MMHG | DIASTOLIC BLOOD PRESSURE: 82 MMHG

## 2020-12-23 PROCEDURE — 99203 OFFICE O/P NEW LOW 30 MIN: CPT | Performed by: SURGERY

## 2020-12-23 NOTE — PATIENT INSTRUCTIONS
SURVEY:    You may be receiving a survey from [employer] regarding your visit today. Please complete the survey to enable us to provide the highest quality of care to you and your family. If you cannot score us a very good on any question, please call the office to discuss how we could have made your experience a very good one. Thank you.

## 2020-12-23 NOTE — PROGRESS NOTES
GENERAL SURGERY CONSULTATION / OFFICE VISIT      Patient's Name/ Date of Birth/ Gender: Jumana Mix / 1960 (56 y.o.) / male     PCP: ELLIOTT Antonio CNP    History of present Illness:  Patient is a pleasant 56 y.o. male  [facility] discussion of painful lumps on chest wall, lipomas upper back, and due screening colonoscopy since Dr Kristy Brantley retired recently. He has had 2 CT of the chest, both negative, one in [facility] 2013 and one recently via [facility]. Images on Epic reviewed. He is examined. See below. Denies trauma to area. Chronic spondylolisthesis. Pain improves when he assumes proper erect posture of back. He does not like to take NSAIDS if possible. He was due to get a colonoscopy by GI but since retired he would like me to do the colonoscopy. Last scope several years ago, do not have report, 2016 elsewhere/[place], history of adenoma polyps. Denies melena or hematochezia. Denies family history colon cancer. Denies changes in bowel habits. Past Medical History:  has a past medical history of BPH (benign prostatic hyperplasia) and Spondylolisthesis. Past Surgical History:   Past Surgical History:   Procedure Laterality Date    COLONOSCOPY      2016    PROSTATE BIOPSY      benign, care everywhere       Social History:  reports that he has never smoked. He has never used smokeless tobacco. He reports that he does not drink alcohol or use drugs. Family History: family history is not on file. Review of Systems:   General: Denies fever, chills, night sweats, weight loss, malaise, fatigue  HEENT: Denies sore throat, sinus problems, allergic rhinosinusitis  Card: Denies chest pain, palpitations, orthopnea/PND. HTN. Pulm: Denies cough, shortness of breath, dyspnea on exertion  GI:  per HPI. : Denies polyuria, dysuria, hematuria. bph  Endo: neg  Heme: neg  Psych: neg  Neuro: Denies h/o CVA, TIA  Skin: Denies rashes, ulcers Musculoskeletal: no gross motor dysfunction. OA    Allergies: Patient has no known allergies. Current Meds:  Current Outpatient Medications:     Na Sulfate-K Sulfate-Mg Sulf (SUPREP BOWEL PREP KIT) 17.5-3.13-1.6 GM/177ML SOLN, Take as directed (Patient not taking: Reported on 3/11/2020), Disp: 2 Bottle, Rfl: 0    fluticasone (FLONASE) 50 MCG/ACT nasal spray, 1 spray by Each Nostril route daily (Patient not taking: Reported on 3/11/2020), Disp: 1 Bottle, Rfl: 0    Physical Exam:  Vital signs and Nurse's note reviewed  Gen:  A&Ox3, NAD. Pleasant and cooperative. HEENT: PERRLA, EOMI, no scleral icterus. Upper back 3 cm lipoma asymptomatic. Right lateral 5 mm lipoma cervical.  Neck:  no goiter  CVS: Regular rate and rhythm  Resp: Good bilateral air entry, no active wheezing, no labored breathing. Chest wall exam- no masses, pain at sternum, chronic, no ecchymosis or cellulitis  Abd: soft, non-tender, non-distended, bowel sounds present, rectal exam can be done at colonoscopy  Ext: Moves all extremities, no gross focal motor deficits. Elbow pain, getting MRI and may need intervention, he wants this addressed first then the colonoscopy. Skin: No erythema or ulcerations     Labs:   Lab Results   Component Value Date    WBC 4.5 02/05/2020    HGB 14.1 02/05/2020    HCT 43.9 02/05/2020    MCV 93.6 02/05/2020     02/05/2020     Lab Results   Component Value Date     02/05/2020    K 4.1 02/05/2020     02/05/2020    CO2 23 02/05/2020    BUN 18 11/16/2020    CREATININE 1.26 11/16/2020    GLUCOSE 97 02/05/2020    CALCIUM 9.0 02/05/2020       Radiologic Studies:  EXAMINATION:   CT OF THE CHEST WITH CONTRAST 11/16/2020 7:52 am       TECHNIQUE:   CT of the chest was performed with the administration of intravenous   contrast. Multiplanar reformatted images are provided for review.  Dose   modulation, iterative reconstruction, and/or weight based adjustment of the mA/kV was utilized to reduce the radiation dose to as low as reasonably   achievable.       COMPARISON:   None.       HISTORY:   ORDERING SYSTEM PROVIDED HISTORY: Sternal pain       FINDINGS:   Mediastinum: Soft tissues of the thoracic inlet are unremarkable.  The   thoracic aorta is normal in caliber.  The main pulmonary artery is normal in   caliber.  There is no pericardial effusion.  There is no mediastinal or hilar   adenopathy.       Lungs/pleura: The lungs are without consolidation effusion. Mulugeta Sade is minimal   atelectasis. Mulugeta Sade is no pneumothorax. Mulugeta [place] is no dominant pulmonary   nodule or mass.  The tracheobronchial tree is patent.       Upper Abdomen: The upper abdomen is grossly unremarkable.       Soft Tissues/Bones: The extrathoracic soft tissues are unremarkable. Mulugeta Sade   is no axillary adenopathy. Mulugeta Sade is no acute osseous abnormality.           Impression   No acute cardiopulmonary process. Impressions/Recommendations:     1) due for screening colonoscopy, history polyps. He will call to get scheduled in summer. Bowel prep instructions given to him. 2) asymptomatic upper back and right lateral neck lipomas, he wants no surgery for these at this time    3) chronic sternal pain suspected costochondritis, NSAIDS recommended. He prefers to not take medications for this. Back brace for posture can help, velcro adjustable ones are available online. Discussed with him. He mainly wanted to make sure there was so underlying mass or abnormality contributing. He has had 2 negative chest CT scans and negative examination by me and another clinician by history. Additional time spent to review past imaging and reports from outside hospital and past Epic notes, separate topics today discussed as detailed above. 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     Sukumar Zarate DO, MPH, FACOS, FACS General Surgery, [address] [place] [address] Rd office [phone]  [place] office [phone]

## 2021-01-29 ENCOUNTER — HOSPITAL ENCOUNTER (OUTPATIENT)
Age: 61
Discharge: HOME | End: 2021-01-29
Payer: COMMERCIAL

## 2021-01-29 LAB — PROSTATE SPECIFIC ANTIGEN: 7 UG/L

## 2021-01-29 PROCEDURE — 36415 COLL VENOUS BLD VENIPUNCTURE: CPT

## 2021-01-29 PROCEDURE — 84153 ASSAY OF PSA TOTAL: CPT

## 2021-03-03 ENCOUNTER — TELEPHONE (OUTPATIENT)
Dept: PRIMARY CARE CLINIC | Age: 61
End: 2021-03-03

## 2021-03-03 DIAGNOSIS — R97.20 BPH WITH ELEVATED PSA: Primary | ICD-10-CM

## 2021-03-03 DIAGNOSIS — N40.0 BPH WITH ELEVATED PSA: Primary | ICD-10-CM

## 2021-03-03 RX ORDER — ALFUZOSIN HYDROCHLORIDE 10 MG/1
TABLET, EXTENDED RELEASE ORAL
COMMUNITY
Start: 2021-02-03

## 2021-03-03 NOTE — TELEPHONE ENCOUNTER
Phone call from patient requesting complete blood work with PSA. States has upcoming appt and usually gets labs done at health fair for work but it was cancelled.      Health Maintenance   Topic Date Due    Lipid screen  Never done    Colon cancer screen colonoscopy  01/27/2019    Shingles Vaccine (1 of 2) 03/11/2021 (Originally 12/20/2008)    Hepatitis C screen  03/11/2021 (Originally 1960)    HIV screen  03/11/2021 (Originally 12/20/1973)    Flu vaccine (1) 09/01/2021 (Originally 9/1/2020)    PSA counseling  01/29/2022    DTaP/Tdap/Td vaccine (2 - Td) 10/23/2028    Hepatitis A vaccine  Aged Out    Hepatitis B vaccine  Aged Out    Hib vaccine  Aged Out    Meningococcal (ACWY) vaccine  Aged Out    Pneumococcal 0-64 years Vaccine  Aged Out             (applicable per patient's age: Cancer Screenings, Depression Screening, Fall Risk Screening, Immunizations)    BUN (mg/dL)   Date Value   11/16/2020 18      (goal A1C is < 7)   (goal LDL is <100) need 30-50% reduction from baseline     BP Readings from Last 3 Encounters:   12/23/20 125/82   09/01/20 124/78   03/11/20 121/72    (goal /80)      All Future Testing planned in [facility]:  Lab Frequency Next Occurrence   COLONOSCOPY W/ OR W/O BIOPSY Once 03/10/2020   IR INJ ARTHROGRAM ELBOW Once 02/18/2021       Next Visit Date:  Future Appointments   Date Time Provider Sara Rowland   3/11/2021  9:00 AM Jose Juan Champagne, APRN - CNP [facility]            Patient Active Problem List:     Tinnitus of both ears     History of colon polyps

## 2021-03-04 ENCOUNTER — TELEPHONE (OUTPATIENT)
Dept: PRIMARY CARE CLINIC | Age: 61
End: 2021-03-04

## 2021-03-04 NOTE — TELEPHONE ENCOUNTER
Informed patient to complete labs. {atient concerned about insurance, informed patient to notify insurance company.

## 2021-03-08 ENCOUNTER — HOSPITAL ENCOUNTER (OUTPATIENT)
Age: 61
Discharge: HOME | End: 2021-03-08
Payer: COMMERCIAL

## 2021-03-08 ENCOUNTER — TELEPHONE (OUTPATIENT)
Dept: PRIMARY CARE CLINIC | Age: 61
End: 2021-03-08

## 2021-03-08 DIAGNOSIS — Z13.220 LIPID SCREENING: ICD-10-CM

## 2021-03-08 DIAGNOSIS — Z13.220 LIPID SCREENING: Primary | ICD-10-CM

## 2021-03-08 DIAGNOSIS — R97.20 BPH WITH ELEVATED PSA: ICD-10-CM

## 2021-03-08 DIAGNOSIS — N40.0 BPH WITH ELEVATED PSA: ICD-10-CM

## 2021-03-08 LAB
ABSOLUTE EOS #: 0.04 K/UL (ref 0–0.44)
ABSOLUTE IMMATURE GRANULOCYTE: <0.03 K/UL (ref 0–0.3)
ABSOLUTE LYMPH #: 1.3 K/UL (ref 1.1–3.7)
ABSOLUTE MONO #: 0.3 K/UL (ref 0.1–1.2)
ALT SERPL-CCNC: 20 U/L (ref 5–41)
ANION GAP SERPL CALCULATED.3IONS-SCNC: 9 MMOL/L (ref 9–17)
AST SERPL-CCNC: 16 U/L
BASOPHILS # BLD: 0 % (ref 0–2)
BASOPHILS ABSOLUTE: <0.03 K/UL (ref 0–0.2)
BUN BLDV-MCNC: 14 MG/DL (ref 8–23)
BUN/CREAT BLD: 12 (ref 9–20)
CALCIUM SERPL-MCNC: 9 MG/DL (ref 8.6–10.4)
CHLORIDE BLD-SCNC: 107 MMOL/L (ref 98–107)
CHOLESTEROL/HDL RATIO: 3.6
CHOLESTEROL: 141 MG/DL
CO2: 25 MMOL/L (ref 20–31)
CREAT SERPL-MCNC: 1.21 MG/DL (ref 0.7–1.2)
DIFFERENTIAL TYPE: NORMAL
EOSINOPHILS RELATIVE PERCENT: 1 % (ref 1–4)
GFR AFRICAN AMERICAN: >60 ML/MIN
GFR NON-AFRICAN AMERICAN: >60 ML/MIN
GFR SERPL CREATININE-BSD FRML MDRD: ABNORMAL ML/MIN/{1.73_M2}
GFR SERPL CREATININE-BSD FRML MDRD: ABNORMAL ML/MIN/{1.73_M2}
GLUCOSE BLD-MCNC: 100 MG/DL (ref 70–99)
HCT VFR BLD CALC: 43.6 % (ref 40.7–50.3)
HDLC SERPL-MCNC: 39 MG/DL
HEMOGLOBIN: 14.1 G/DL (ref 13–17)
IMMATURE GRANULOCYTES: 0 %
LDL CHOLESTEROL: 86 MG/DL (ref 0–130)
LYMPHOCYTES # BLD: 28 % (ref 24–43)
MCH RBC QN AUTO: 30.5 PG (ref 25.2–33.5)
MCHC RBC AUTO-ENTMCNC: 32.3 G/DL (ref 28.4–34.8)
MCV RBC AUTO: 94.4 FL (ref 82.6–102.9)
MONOCYTES # BLD: 6 % (ref 3–12)
NRBC AUTOMATED: 0 PER 100 WBC
PDW BLD-RTO: 12.2 % (ref 11.8–14.4)
PLATELET # BLD: 237 K/UL (ref 138–453)
PLATELET ESTIMATE: NORMAL
PMV BLD AUTO: 10.2 FL (ref 8.1–13.5)
POTASSIUM SERPL-SCNC: 4.5 MMOL/L (ref 3.7–5.3)
PROSTATE SPECIFIC ANTIGEN: 8.29 UG/L
RBC # BLD: 4.62 M/UL (ref 4.21–5.77)
RBC # BLD: NORMAL 10*6/UL
SEG NEUTROPHILS: 65 % (ref 36–65)
SEGMENTED NEUTROPHILS ABSOLUTE COUNT: 3.05 K/UL (ref 1.5–8.1)
SODIUM BLD-SCNC: 141 MMOL/L (ref 135–144)
TRIGL SERPL-MCNC: 78 MG/DL
VLDLC SERPL CALC-MCNC: ABNORMAL MG/DL (ref 1–30)
WBC # BLD: 4.7 K/UL (ref 3.5–11.3)
WBC # BLD: NORMAL 10*3/UL

## 2021-03-08 PROCEDURE — 85025 COMPLETE CBC W/AUTO DIFF WBC: CPT

## 2021-03-08 PROCEDURE — 84450 TRANSFERASE (AST) (SGOT): CPT

## 2021-03-08 PROCEDURE — G0103 PSA SCREENING: HCPCS

## 2021-03-08 PROCEDURE — 80061 LIPID PANEL: CPT

## 2021-03-08 PROCEDURE — 84460 ALANINE AMINO (ALT) (SGPT): CPT

## 2021-03-08 PROCEDURE — 80048 BASIC METABOLIC PNL TOTAL CA: CPT

## 2021-03-08 PROCEDURE — 36415 COLL VENOUS BLD VENIPUNCTURE: CPT

## 2021-03-11 ENCOUNTER — OFFICE VISIT (OUTPATIENT)
Dept: PRIMARY CARE CLINIC | Age: 61
End: 2021-03-11
Payer: COMMERCIAL

## 2021-03-11 VITALS
WEIGHT: 235.4 LBS | HEIGHT: 73 IN | TEMPERATURE: 97.8 F | DIASTOLIC BLOOD PRESSURE: 80 MMHG | RESPIRATION RATE: 20 BRPM | SYSTOLIC BLOOD PRESSURE: 128 MMHG | HEART RATE: 74 BPM | BODY MASS INDEX: 31.2 KG/M2

## 2021-03-11 DIAGNOSIS — L40.50 PSORIATIC ARTHRITIS (HCC): Primary | ICD-10-CM

## 2021-03-11 DIAGNOSIS — R97.20 BPH WITH ELEVATED PSA: ICD-10-CM

## 2021-03-11 DIAGNOSIS — N40.0 BPH WITH ELEVATED PSA: ICD-10-CM

## 2021-03-11 PROCEDURE — 99214 OFFICE O/P EST MOD 30 MIN: CPT | Performed by: NURSE PRACTITIONER

## 2021-03-11 ASSESSMENT — ENCOUNTER SYMPTOMS
ABDOMINAL PAIN: 0
SORE THROAT: 0
NAUSEA: 0
CONSTIPATION: 0
VOMITING: 0
SHORTNESS OF BREATH: 0
DIARRHEA: 0
COUGH: 0
WHEEZING: 0
RHINORRHEA: 0

## 2021-03-11 ASSESSMENT — PATIENT HEALTH QUESTIONNAIRE - PHQ9
2. FEELING DOWN, DEPRESSED OR HOPELESS: 0
SUM OF ALL RESPONSES TO PHQ QUESTIONS 1-9: 0

## 2021-03-11 NOTE — PROGRESS NOTES
Name: Kimberly Medina  : 1960         Chief Complaint:     Chief Complaint   Patient presents with    Check-Up     routine check, concerned about arthritis        History of Present Illness:      Kimberly Medina is a 56 y.o.  male who presents with Check-Up (routine check, concerned about arthritis )      Jordi Looney is here today for a routine office visit. Psoriatic arthritispatient states he has been following with Ortho for various joint issues. He states most recently has had problem with his left elbow. He states his orthopedic doctor is positive this is psoriatic arthritis. He does have psoriasis rash intermittently on the legs and other parts of his body. He would like referral to rheumatology for evaluation. BPHPSA is worsening, patient denies any lower urinary tract symptoms. He states his urologist is watching at this point. He is contemplating a second opinion at this point. We will provide referral if requested. Past Medical History:     Past Medical History:   Diagnosis Date    BPH (benign prostatic hyperplasia)     Spondylolisthesis       Reviewed all health maintenance requirements and ordered appropriate tests  There are no preventive care reminders to display for this patient. Past Surgical History:     Past Surgical History:   Procedure Laterality Date    COLONOSCOPY      2016    PROSTATE BIOPSY      benign, care everywhere        Medications:       Prior to Admission medications    Medication Sig Start Date End Date Taking?  Authorizing Provider   alfuzosin (UROXATRAL) 10 MG extended release tablet TAKE 1 TABLET BY MOUTH EVERY DAY 2/3/21   Karmen Card   Na Sulfate-K Sulfate-Mg Sulf (SUPREP BOWEL PREP KIT) 17.5-3.13-1.6 GM/177ML SOLN Take as directed  Patient not taking: Reported on 3/11/2020 3/10/20   Ignacio Guerra MD   fluticasone Ivet Smith) 50 MCG/ACT nasal spray 1 spray by Each Nostril route daily  Patient not taking: Reported on 3/11/2020 2/4/20   Gus Gallego sounds are normal. There is no distension. Palpations: Abdomen is soft. Tenderness: There is no abdominal tenderness. Musculoskeletal:      Right lower leg: No edema. Left lower leg: No edema. Skin:     General: Skin is warm and dry. Neurological:      Mental Status: He is alert and oriented to person, place, and time. Psychiatric:         Mood and Affect: Mood normal.         Behavior: Behavior normal.         Data:     Lab Results   Component Value Date     03/08/2021    K 4.5 03/08/2021     03/08/2021    CO2 25 03/08/2021    BUN 14 03/08/2021    CREATININE 1.21 03/08/2021    GLUCOSE 100 03/08/2021    AST 16 03/08/2021    ALT 20 03/08/2021     Lab Results   Component Value Date    WBC 4.7 03/08/2021    RBC 4.62 03/08/2021    HGB 14.1 03/08/2021    HCT 43.6 03/08/2021    MCV 94.4 03/08/2021    MCH 30.5 03/08/2021    MCHC 32.3 03/08/2021    RDW 12.2 03/08/2021     03/08/2021    MPV 10.2 03/08/2021     Lab Results   Component Value Date    TSH 3.94 02/05/2020     Lab Results   Component Value Date    CHOL 141 03/08/2021    HDL 39 03/08/2021    PSA 8.29 03/08/2021       Assessment/Plan:      Diagnosis Orders   1. Psoriatic arthritis ([facility] [address]  Kellie Magana MD, Rheumatology, [facility]   2. BPH with elevated PSA       We will provide referral to rheumatology for evaluation. Continue monitoring labs. 1.  Nitin received counseling on the following healthy behaviors: nutrition, exercise and medication adherence  2. Patient given educational materials - see patient instructions  3. Was a self-tracking handout given in paper form or via [employer]? No  If yes, see orders or list here. 4.  Discussed use, benefit, and side effects of prescribed medications. Barriers to medication compliance addressed. All patient questions answered. Pt voiced understanding. 5.  Reviewed prior labs and health maintenance  6.   Continue current medications, diet and exercise. Completed Refills   Requested Prescriptions      No prescriptions requested or ordered in this encounter         Return in about 1 year (around 3/11/2022) for Check up.

## 2021-11-29 ENCOUNTER — HOSPITAL ENCOUNTER (OUTPATIENT)
Age: 61
Discharge: HOME | End: 2021-11-29
Payer: COMMERCIAL

## 2021-11-29 LAB — PROSTATE SPECIFIC ANTIGEN: 5.51 UG/L

## 2021-11-29 PROCEDURE — 84153 ASSAY OF PSA TOTAL: CPT

## 2021-11-29 PROCEDURE — 36415 COLL VENOUS BLD VENIPUNCTURE: CPT

## 2024-08-02 ENCOUNTER — OFFICE VISIT (OUTPATIENT)
Dept: PRIMARY CARE CLINIC | Age: 64
End: 2024-08-02
Payer: MEDICARE

## 2024-08-02 VITALS
SYSTOLIC BLOOD PRESSURE: 128 MMHG | WEIGHT: 227 LBS | OXYGEN SATURATION: 97 % | HEIGHT: 71 IN | TEMPERATURE: 98.5 F | BODY MASS INDEX: 31.78 KG/M2 | HEART RATE: 74 BPM | DIASTOLIC BLOOD PRESSURE: 80 MMHG | RESPIRATION RATE: 20 BRPM

## 2024-08-02 DIAGNOSIS — D17.0 LIPOMA OF NECK: Primary | ICD-10-CM

## 2024-08-02 DIAGNOSIS — Z76.89 ENCOUNTER TO ESTABLISH CARE: ICD-10-CM

## 2024-08-02 DIAGNOSIS — B35.1 ONYCHOMYCOSIS: ICD-10-CM

## 2024-08-02 DIAGNOSIS — Z13.220 LIPID SCREENING: ICD-10-CM

## 2024-08-02 PROCEDURE — 1123F ACP DISCUSS/DSCN MKR DOCD: CPT | Performed by: NURSE PRACTITIONER

## 2024-08-02 PROCEDURE — 3017F COLORECTAL CA SCREEN DOC REV: CPT | Performed by: NURSE PRACTITIONER

## 2024-08-02 PROCEDURE — 99204 OFFICE O/P NEW MOD 45 MIN: CPT | Performed by: NURSE PRACTITIONER

## 2024-08-02 PROCEDURE — G8427 DOCREV CUR MEDS BY ELIG CLIN: HCPCS | Performed by: NURSE PRACTITIONER

## 2024-08-02 PROCEDURE — 1036F TOBACCO NON-USER: CPT | Performed by: NURSE PRACTITIONER

## 2024-08-02 PROCEDURE — G8417 CALC BMI ABV UP PARAM F/U: HCPCS | Performed by: NURSE PRACTITIONER

## 2024-08-02 SDOH — ECONOMIC STABILITY: INCOME INSECURITY: HOW HARD IS IT FOR YOU TO PAY FOR THE VERY BASICS LIKE FOOD, HOUSING, MEDICAL CARE, AND HEATING?: PATIENT DECLINED

## 2024-08-02 SDOH — ECONOMIC STABILITY: FOOD INSECURITY: WITHIN THE PAST 12 MONTHS, YOU WORRIED THAT YOUR FOOD WOULD RUN OUT BEFORE YOU GOT MONEY TO BUY MORE.: PATIENT DECLINED

## 2024-08-02 SDOH — ECONOMIC STABILITY: HOUSING INSECURITY
IN THE LAST 12 MONTHS, WAS THERE A TIME WHEN YOU DID NOT HAVE A STEADY PLACE TO SLEEP OR SLEPT IN A SHELTER (INCLUDING NOW)?: PATIENT DECLINED

## 2024-08-02 SDOH — ECONOMIC STABILITY: FOOD INSECURITY: WITHIN THE PAST 12 MONTHS, THE FOOD YOU BOUGHT JUST DIDN'T LAST AND YOU DIDN'T HAVE MONEY TO GET MORE.: PATIENT DECLINED

## 2024-08-02 ASSESSMENT — ENCOUNTER SYMPTOMS
WHEEZING: 0
ABDOMINAL PAIN: 0
SHORTNESS OF BREATH: 0
EYE PAIN: 0
SWOLLEN GLANDS: 0
CONSTIPATION: 0
NAIL CHANGES: 1
COLOR CHANGE: 1
NAUSEA: 0
COUGH: 0
DIARRHEA: 0
VISUAL CHANGE: 0
CHANGE IN BOWEL HABIT: 0
VOMITING: 0
SORE THROAT: 0
RHINORRHEA: 0

## 2024-08-02 ASSESSMENT — PATIENT HEALTH QUESTIONNAIRE - PHQ9
1. LITTLE INTEREST OR PLEASURE IN DOING THINGS: NOT AT ALL
SUM OF ALL RESPONSES TO PHQ QUESTIONS 1-9: 0
SUM OF ALL RESPONSES TO PHQ9 QUESTIONS 1 & 2: 0
2. FEELING DOWN, DEPRESSED OR HOPELESS: NOT AT ALL

## 2024-08-02 NOTE — PROGRESS NOTES
Name: Nitin Lr  : 1960         Chief Complaint:     Chief Complaint   Patient presents with    Establish Care     Establish care, has not been seen since 3/11/2021. Would like referral to [facility] for lump in neck on right side X 4 years.       History of Present Illness:      Nitin Lr is a 63 y.o.  male who presents with Establish Care (Establish care, has not been seen since 3/11/2021. Would like referral to [facility] for lump in neck on right side X 4 years.)      Nitin is here today to establish care.    Has not been seen in over 3.5 years.     He states he would like to have definitive care for a neck lipoma.  He states he saw a provider in [place] and was very unhappy with the outcome of the visit.  He states he felt as though he was ignored.  He is requesting a referral to [facility] as soon as possible.  He would like to get this taken care of now.  He also tells me that he has had fungal infection in his fingernails.  He states this is been bothering for many years and has been using bleach to take care of the issue.  He states he cannot use bleach every day as it irritates his skin.  He would like to complete screening labs but states unfortunately [facility] discontinued their community out reach laboratory screening.  I did inform him that they will do all current labs every day of the week.  He is given information on pricing.  He states he will get labs completed today.    I did explain to him that it may take some time to refer him to [facility] for his lipoma.  We will need to find t a provider who specializes in this type of procedure.  He would also like to start an oral medication for the onychomycosis.  He will obtain pretreatment labs.  See below for further comments.    [place]  This is a chronic (NECK LIPOMA) problem. The current episode started more than 1 year ago. The problem occurs constantly. The problem has been gradually worsening. Associated symptoms include neck pain. Pertinent

## 2024-09-05 ENCOUNTER — NURSE ONLY (OUTPATIENT)
Dept: PRIMARY CARE CLINIC | Age: 64
End: 2024-09-05

## 2024-09-05 VITALS
RESPIRATION RATE: 18 BRPM | SYSTOLIC BLOOD PRESSURE: 114 MMHG | BODY MASS INDEX: 31.78 KG/M2 | OXYGEN SATURATION: 97 % | HEART RATE: 70 BPM | DIASTOLIC BLOOD PRESSURE: 62 MMHG | WEIGHT: 227 LBS

## 2024-09-05 DIAGNOSIS — Z86.79 HISTORY OF ELEVATED BLOOD PRESSURE WHILE IN HOSPITAL: Primary | ICD-10-CM

## 2024-09-05 RX ORDER — SILODOSIN 4 MG/1
4 CAPSULE ORAL NIGHTLY
COMMUNITY
Start: 2024-08-27

## 2024-09-05 RX ORDER — FINASTERIDE 5 MG/1
5 TABLET, FILM COATED ORAL DAILY
COMMUNITY
Start: 2024-08-27

## 2024-09-05 RX ORDER — IBUPROFEN 600 MG/1
600 TABLET, FILM COATED ORAL EVERY 6 HOURS PRN
COMMUNITY

## 2024-09-05 ASSESSMENT — PATIENT HEALTH QUESTIONNAIRE - PHQ9
SUM OF ALL RESPONSES TO PHQ QUESTIONS 1-9: 0
SUM OF ALL RESPONSES TO PHQ9 QUESTIONS 1 & 2: 0
1. LITTLE INTEREST OR PLEASURE IN DOING THINGS: NOT AT ALL
SUM OF ALL RESPONSES TO PHQ QUESTIONS 1-9: 0
2. FEELING DOWN, DEPRESSED OR HOPELESS: NOT AT ALL

## 2024-11-05 ENCOUNTER — TELEPHONE (OUTPATIENT)
Dept: PRIMARY CARE CLINIC | Age: 64
End: 2024-11-05

## 2025-01-15 ENCOUNTER — TELEPHONE (OUTPATIENT)
Dept: PRIMARY CARE CLINIC | Age: 65
End: 2025-01-15

## 2025-06-09 ENCOUNTER — TELEPHONE (OUTPATIENT)
Dept: PRIMARY CARE CLINIC | Age: 65
End: 2025-06-09